# Patient Record
Sex: FEMALE | Race: WHITE | NOT HISPANIC OR LATINO | URBAN - METROPOLITAN AREA
[De-identification: names, ages, dates, MRNs, and addresses within clinical notes are randomized per-mention and may not be internally consistent; named-entity substitution may affect disease eponyms.]

---

## 2019-02-04 ENCOUNTER — INPATIENT (INPATIENT)
Facility: HOSPITAL | Age: 84
LOS: 6 days | Discharge: NURSING, REHAB OR LONG-TERM CARE | DRG: 482 | End: 2019-02-11
Attending: ORTHOPAEDIC SURGERY | Admitting: ORTHOPAEDIC SURGERY
Payer: MEDICARE

## 2019-02-04 ENCOUNTER — TRANSCRIPTION ENCOUNTER (OUTPATIENT)
Age: 84
End: 2019-02-04

## 2019-02-04 VITALS
SYSTOLIC BLOOD PRESSURE: 176 MMHG | DIASTOLIC BLOOD PRESSURE: 86 MMHG | RESPIRATION RATE: 20 BRPM | TEMPERATURE: 98 F | HEART RATE: 82 BPM | OXYGEN SATURATION: 98 % | WEIGHT: 130.07 LBS

## 2019-02-04 DIAGNOSIS — Z98.891 HISTORY OF UTERINE SCAR FROM PREVIOUS SURGERY: Chronic | ICD-10-CM

## 2019-02-04 DIAGNOSIS — Z90.49 ACQUIRED ABSENCE OF OTHER SPECIFIED PARTS OF DIGESTIVE TRACT: Chronic | ICD-10-CM

## 2019-02-04 LAB
ALBUMIN SERPL ELPH-MCNC: 4.1 G/DL — SIGNIFICANT CHANGE UP (ref 3.3–5)
ALP SERPL-CCNC: 86 U/L — SIGNIFICANT CHANGE UP (ref 40–120)
ALT FLD-CCNC: 14 U/L — SIGNIFICANT CHANGE UP (ref 10–45)
ANION GAP SERPL CALC-SCNC: 14 MMOL/L — SIGNIFICANT CHANGE UP (ref 5–17)
APTT BLD: 28.9 SEC — SIGNIFICANT CHANGE UP (ref 27.5–36.3)
AST SERPL-CCNC: 20 U/L — SIGNIFICANT CHANGE UP (ref 10–40)
BASOPHILS # BLD AUTO: 0 K/UL — SIGNIFICANT CHANGE UP (ref 0–0.2)
BASOPHILS NFR BLD AUTO: 0.4 % — SIGNIFICANT CHANGE UP (ref 0–2)
BILIRUB SERPL-MCNC: 0.3 MG/DL — SIGNIFICANT CHANGE UP (ref 0.2–1.2)
BLD GP AB SCN SERPL QL: NEGATIVE — SIGNIFICANT CHANGE UP
BUN SERPL-MCNC: 15 MG/DL — SIGNIFICANT CHANGE UP (ref 7–23)
CALCIUM SERPL-MCNC: 8.9 MG/DL — SIGNIFICANT CHANGE UP (ref 8.4–10.5)
CHLORIDE SERPL-SCNC: 97 MMOL/L — SIGNIFICANT CHANGE UP (ref 96–108)
CO2 SERPL-SCNC: 25 MMOL/L — SIGNIFICANT CHANGE UP (ref 22–31)
CREAT SERPL-MCNC: 0.39 MG/DL — LOW (ref 0.5–1.3)
EOSINOPHIL # BLD AUTO: 0 K/UL — SIGNIFICANT CHANGE UP (ref 0–0.5)
EOSINOPHIL NFR BLD AUTO: 0.4 % — SIGNIFICANT CHANGE UP (ref 0–6)
GLUCOSE SERPL-MCNC: 130 MG/DL — HIGH (ref 70–99)
HCT VFR BLD CALC: 44.5 % — SIGNIFICANT CHANGE UP (ref 34.5–45)
HGB BLD-MCNC: 15.2 G/DL — SIGNIFICANT CHANGE UP (ref 11.5–15.5)
INR BLD: 0.91 RATIO — SIGNIFICANT CHANGE UP (ref 0.88–1.16)
LYMPHOCYTES # BLD AUTO: 1.7 K/UL — SIGNIFICANT CHANGE UP (ref 1–3.3)
LYMPHOCYTES # BLD AUTO: 19.1 % — SIGNIFICANT CHANGE UP (ref 13–44)
MCHC RBC-ENTMCNC: 29.7 PG — SIGNIFICANT CHANGE UP (ref 27–34)
MCHC RBC-ENTMCNC: 34.2 GM/DL — SIGNIFICANT CHANGE UP (ref 32–36)
MCV RBC AUTO: 86.9 FL — SIGNIFICANT CHANGE UP (ref 80–100)
MONOCYTES # BLD AUTO: 0.6 K/UL — SIGNIFICANT CHANGE UP (ref 0–0.9)
MONOCYTES NFR BLD AUTO: 6.6 % — SIGNIFICANT CHANGE UP (ref 2–14)
NEUTROPHILS # BLD AUTO: 6.6 K/UL — SIGNIFICANT CHANGE UP (ref 1.8–7.4)
NEUTROPHILS NFR BLD AUTO: 73.5 % — SIGNIFICANT CHANGE UP (ref 43–77)
PLATELET # BLD AUTO: 276 K/UL — SIGNIFICANT CHANGE UP (ref 150–400)
POTASSIUM SERPL-MCNC: 3.6 MMOL/L — SIGNIFICANT CHANGE UP (ref 3.5–5.3)
POTASSIUM SERPL-SCNC: 3.6 MMOL/L — SIGNIFICANT CHANGE UP (ref 3.5–5.3)
PROT SERPL-MCNC: 7.4 G/DL — SIGNIFICANT CHANGE UP (ref 6–8.3)
PROTHROM AB SERPL-ACNC: 10.4 SEC — SIGNIFICANT CHANGE UP (ref 10–12.9)
RBC # BLD: 5.12 M/UL — SIGNIFICANT CHANGE UP (ref 3.8–5.2)
RBC # FLD: 12.4 % — SIGNIFICANT CHANGE UP (ref 10.3–14.5)
RH IG SCN BLD-IMP: POSITIVE — SIGNIFICANT CHANGE UP
SODIUM SERPL-SCNC: 136 MMOL/L — SIGNIFICANT CHANGE UP (ref 135–145)
WBC # BLD: 9 K/UL — SIGNIFICANT CHANGE UP (ref 3.8–10.5)
WBC # FLD AUTO: 9 K/UL — SIGNIFICANT CHANGE UP (ref 3.8–10.5)

## 2019-02-04 PROCEDURE — 73552 X-RAY EXAM OF FEMUR 2/>: CPT | Mod: 26,RT

## 2019-02-04 PROCEDURE — 70450 CT HEAD/BRAIN W/O DYE: CPT | Mod: 26

## 2019-02-04 PROCEDURE — 93010 ELECTROCARDIOGRAM REPORT: CPT

## 2019-02-04 PROCEDURE — 73502 X-RAY EXAM HIP UNI 2-3 VIEWS: CPT | Mod: 26,RT

## 2019-02-04 PROCEDURE — 99285 EMERGENCY DEPT VISIT HI MDM: CPT | Mod: 25

## 2019-02-04 PROCEDURE — 71045 X-RAY EXAM CHEST 1 VIEW: CPT | Mod: 26

## 2019-02-04 RX ORDER — ACETAMINOPHEN 500 MG
1000 TABLET ORAL ONCE
Qty: 0 | Refills: 0 | Status: COMPLETED | OUTPATIENT
Start: 2019-02-04 | End: 2019-02-04

## 2019-02-04 RX ORDER — LABETALOL HCL 100 MG
10 TABLET ORAL ONCE
Qty: 0 | Refills: 0 | Status: COMPLETED | OUTPATIENT
Start: 2019-02-04 | End: 2019-02-04

## 2019-02-04 RX ADMIN — Medication 10 MILLIGRAM(S): at 21:38

## 2019-02-04 RX ADMIN — Medication 400 MILLIGRAM(S): at 21:50

## 2019-02-04 RX ADMIN — Medication 1000 MILLIGRAM(S): at 22:00

## 2019-02-04 NOTE — ED ADULT NURSE NOTE - CHPI ED NUR SYMPTOMS NEG
no confusion/no fever/no vomiting/no loss of consciousness/no tingling/no numbness/no abrasion/no bleeding

## 2019-02-04 NOTE — ED PROVIDER NOTE - MUSCULOSKELETAL, MLM
Spine appears normal, no midline spine tenderness, R Hip TTP. R LE is shortened and externally rotated at rest. Pain w/ R LE movement.

## 2019-02-04 NOTE — ED ADULT NURSE NOTE - NSIMPLEMENTINTERV_GEN_ALL_ED
Implemented All Fall Risk Interventions:  [place] to call system. Call bell, personal items and telephone within reach. Instruct patient to call for assistance. Room bathroom lighting operational. Non-slip footwear when patient is off stretcher. Physically safe environment: no spills, clutter or unnecessary equipment. Stretcher in lowest position, wheels locked, appropriate side rails in place. Provide visual cue, wrist band, yellow gown, etc. Monitor gait and stability. Monitor for mental status changes and reorient to person, place, and time. Review medications for side effects contributing to fall risk. Reinforce activity limits and safety measures with patient and family.

## 2019-02-04 NOTE — ED ADULT NURSE NOTE - OBJECTIVE STATEMENT
82 YO female with PMH HTN via EMS from home presenting with complaints of pain sp fall. As per EMS, while patient was walking on wood floor, pt experienced mechanical fall and fell onto right hip. Pt developed pain and was provided IV morphine from EMS with relief. Pt denies hitting head, pt denies losing consciousness, pt able to recall all events. Pt denies chest pain, shortness of breath, visual disturbances, numbness/tingling, fever, chills, diaphoresis, headache, nausea, vomiting, constipation, diarrhea, or urinary symptoms.   Pt Axox4, gross neuro intact, PERRL 3 mm. Lungs clear throughout bilateral. S1S2 heard, normal sinus on cardiac monitor. Abdomen soft, non-tender, non-distended. Skin warm, dry, and intact. Right leg shortened and externally rotated. Pt placed in position of comfort. Pt educated on call bell system and provided call bell. Bed in lowest position, wheels locked, appropriate side rails raised. Pt denies needs at this time.

## 2019-02-04 NOTE — ED PROVIDER NOTE - ATTENDING CONTRIBUTION TO CARE
84 yo F, accompanied by daughter whom she lives with, w/ no known PMH other than possibly hypertension, with r hip ext rotation, can not raise leg, analgesia given prehospital, plain films ordered, r/o hip fx, mechanical fall, preop labs, no head injury with ha for several days, well appearing, ct head ordered.

## 2019-02-04 NOTE — ED PROVIDER NOTE - PROGRESS NOTE DETAILS
Xray pelvis demonstrating Acute right intertrochenteric hip fracture. Paged orthopedic surgery resident taking consults. Awaiting callback. Spoke with orthopedic surgery resident. Will see patient in ED.

## 2019-02-04 NOTE — ED PROVIDER NOTE - OBJECTIVE STATEMENT
84 yo F, accompanied by daughter whom she lives with, w/ no known PMH other than possibly hypertension because patient refuses to see doctors, presenting to [facility] ED from home d/t mechanical fall w/ R hip pain and deformity. 82 yo F, accompanied by daughter whom she lives with, w/ no known PMH other than possibly hypertension because patient refuses to see doctors, presenting to [facility] ED from home d/t mechanical fall w/ R hip pain and deformity. Patient was picking up dishes when she accidentally fell at home. Denies cp, sob, lightheadedness, palpitations, LOC before, during, or after the fall. Felt immediate R hip pain. Patient denies other complaints other than chronic headache. Denies neck stiffness, fever/chills, vision change, chest pain, shortness of breath, difficulty breathing, palpitations, lightheadedness, weakness, dizziness, numbness, tingling, abdominal pain, nausea, vomiting, diarrhea, dysuria, hematuria, syncope.     PMD: Does not have one  Pharmacy: [facility], [place], [place]

## 2019-02-05 DIAGNOSIS — S72.143A DISPLACED INTERTROCHANTERIC FRACTURE OF UNSPECIFIED FEMUR, INITIAL ENCOUNTER FOR CLOSED FRACTURE: ICD-10-CM

## 2019-02-05 LAB
ANION GAP SERPL CALC-SCNC: 13 MMOL/L — SIGNIFICANT CHANGE UP (ref 5–17)
APPEARANCE UR: CLEAR — SIGNIFICANT CHANGE UP
APTT BLD: 29 SEC — SIGNIFICANT CHANGE UP (ref 27.5–36.3)
BILIRUB UR-MCNC: NEGATIVE — SIGNIFICANT CHANGE UP
BUN SERPL-MCNC: 14 MG/DL — SIGNIFICANT CHANGE UP (ref 7–23)
CALCIUM SERPL-MCNC: 9.3 MG/DL — SIGNIFICANT CHANGE UP (ref 8.4–10.5)
CHLORIDE SERPL-SCNC: 98 MMOL/L — SIGNIFICANT CHANGE UP (ref 96–108)
CO2 SERPL-SCNC: 24 MMOL/L — SIGNIFICANT CHANGE UP (ref 22–31)
COLOR SPEC: YELLOW — SIGNIFICANT CHANGE UP
CREAT SERPL-MCNC: 0.38 MG/DL — LOW (ref 0.5–1.3)
DIFF PNL FLD: NEGATIVE — SIGNIFICANT CHANGE UP
GLUCOSE SERPL-MCNC: 157 MG/DL — HIGH (ref 70–99)
GLUCOSE UR QL: NEGATIVE — SIGNIFICANT CHANGE UP
HCT VFR BLD CALC: 35.3 % — SIGNIFICANT CHANGE UP (ref 34.5–45)
HCT VFR BLD CALC: 41.9 % — SIGNIFICANT CHANGE UP (ref 34.5–45)
HGB BLD-MCNC: 12.2 G/DL — SIGNIFICANT CHANGE UP (ref 11.5–15.5)
HGB BLD-MCNC: 14.2 G/DL — SIGNIFICANT CHANGE UP (ref 11.5–15.5)
INR BLD: 0.94 RATIO — SIGNIFICANT CHANGE UP (ref 0.88–1.16)
KETONES UR-MCNC: SIGNIFICANT CHANGE UP
LEUKOCYTE ESTERASE UR-ACNC: NEGATIVE — SIGNIFICANT CHANGE UP
MCHC RBC-ENTMCNC: 29.5 PG — SIGNIFICANT CHANGE UP (ref 27–34)
MCHC RBC-ENTMCNC: 30.2 PG — SIGNIFICANT CHANGE UP (ref 27–34)
MCHC RBC-ENTMCNC: 33.9 GM/DL — SIGNIFICANT CHANGE UP (ref 32–36)
MCHC RBC-ENTMCNC: 34.5 GM/DL — SIGNIFICANT CHANGE UP (ref 32–36)
MCV RBC AUTO: 86.9 FL — SIGNIFICANT CHANGE UP (ref 80–100)
MCV RBC AUTO: 87.5 FL — SIGNIFICANT CHANGE UP (ref 80–100)
NITRITE UR-MCNC: NEGATIVE — SIGNIFICANT CHANGE UP
PH UR: 5.5 — SIGNIFICANT CHANGE UP (ref 5–8)
PLATELET # BLD AUTO: 254 K/UL — SIGNIFICANT CHANGE UP (ref 150–400)
PLATELET # BLD AUTO: ABNORMAL (ref 150–400)
POTASSIUM SERPL-MCNC: 4 MMOL/L — SIGNIFICANT CHANGE UP (ref 3.5–5.3)
POTASSIUM SERPL-SCNC: 4 MMOL/L — SIGNIFICANT CHANGE UP (ref 3.5–5.3)
PROT UR-MCNC: NEGATIVE — SIGNIFICANT CHANGE UP
PROTHROM AB SERPL-ACNC: 10.8 SEC — SIGNIFICANT CHANGE UP (ref 10–12.9)
RBC # BLD: 4.03 M/UL — SIGNIFICANT CHANGE UP (ref 3.8–5.2)
RBC # BLD: 4.82 M/UL — SIGNIFICANT CHANGE UP (ref 3.8–5.2)
RBC # FLD: 12.1 % — SIGNIFICANT CHANGE UP (ref 10.3–14.5)
RBC # FLD: 12.3 % — SIGNIFICANT CHANGE UP (ref 10.3–14.5)
RH IG SCN BLD-IMP: POSITIVE — SIGNIFICANT CHANGE UP
SODIUM SERPL-SCNC: 135 MMOL/L — SIGNIFICANT CHANGE UP (ref 135–145)
SP GR SPEC: 1.03 — HIGH (ref 1.01–1.02)
UROBILINOGEN FLD QL: NEGATIVE — SIGNIFICANT CHANGE UP
WBC # BLD: 15.6 K/UL — HIGH (ref 3.8–10.5)
WBC # BLD: 17 K/UL — HIGH (ref 3.8–10.5)
WBC # FLD AUTO: 15.6 K/UL — HIGH (ref 3.8–10.5)
WBC # FLD AUTO: 17 K/UL — HIGH (ref 3.8–10.5)

## 2019-02-05 PROCEDURE — 27245 TREAT THIGH FRACTURE: CPT | Mod: RT

## 2019-02-05 RX ORDER — ONDANSETRON 8 MG/1
4 TABLET, FILM COATED ORAL ONCE
Qty: 0 | Refills: 0 | Status: DISCONTINUED | OUTPATIENT
Start: 2019-02-05 | End: 2019-02-06

## 2019-02-05 RX ORDER — ACETAMINOPHEN 500 MG
650 TABLET ORAL EVERY 6 HOURS
Qty: 0 | Refills: 0 | Status: DISCONTINUED | OUTPATIENT
Start: 2019-02-05 | End: 2019-02-11

## 2019-02-05 RX ORDER — DOCUSATE SODIUM 100 MG
100 CAPSULE ORAL THREE TIMES A DAY
Qty: 0 | Refills: 0 | Status: DISCONTINUED | OUTPATIENT
Start: 2019-02-05 | End: 2019-02-05

## 2019-02-05 RX ORDER — SODIUM CHLORIDE 9 MG/ML
1000 INJECTION, SOLUTION INTRAVENOUS
Qty: 0 | Refills: 0 | Status: DISCONTINUED | OUTPATIENT
Start: 2019-02-05 | End: 2019-02-11

## 2019-02-05 RX ORDER — OXYCODONE HYDROCHLORIDE 5 MG/1
10 TABLET ORAL EVERY 4 HOURS
Qty: 0 | Refills: 0 | Status: DISCONTINUED | OUTPATIENT
Start: 2019-02-05 | End: 2019-02-05

## 2019-02-05 RX ORDER — SENNA PLUS 8.6 MG/1
2 TABLET ORAL AT BEDTIME
Qty: 0 | Refills: 0 | Status: DISCONTINUED | OUTPATIENT
Start: 2019-02-05 | End: 2019-02-11

## 2019-02-05 RX ORDER — POLYETHYLENE GLYCOL 3350 17 G/17G
17 POWDER, FOR SOLUTION ORAL DAILY
Qty: 0 | Refills: 0 | Status: DISCONTINUED | OUTPATIENT
Start: 2019-02-05 | End: 2019-02-11

## 2019-02-05 RX ORDER — CEFAZOLIN SODIUM 1 G
2000 VIAL (EA) INJECTION EVERY 8 HOURS
Qty: 0 | Refills: 0 | Status: COMPLETED | OUTPATIENT
Start: 2019-02-06 | End: 2019-02-06

## 2019-02-05 RX ORDER — SODIUM CHLORIDE 9 MG/ML
1000 INJECTION, SOLUTION INTRAVENOUS
Qty: 0 | Refills: 0 | Status: DISCONTINUED | OUTPATIENT
Start: 2019-02-05 | End: 2019-02-05

## 2019-02-05 RX ORDER — FOLIC ACID 0.8 MG
1 TABLET ORAL DAILY
Qty: 0 | Refills: 0 | Status: DISCONTINUED | OUTPATIENT
Start: 2019-02-05 | End: 2019-02-10

## 2019-02-05 RX ORDER — HYDROMORPHONE HYDROCHLORIDE 2 MG/ML
0.5 INJECTION INTRAMUSCULAR; INTRAVENOUS; SUBCUTANEOUS EVERY 4 HOURS
Qty: 0 | Refills: 0 | Status: DISCONTINUED | OUTPATIENT
Start: 2019-02-05 | End: 2019-02-11

## 2019-02-05 RX ORDER — PANTOPRAZOLE SODIUM 20 MG/1
40 TABLET, DELAYED RELEASE ORAL
Qty: 0 | Refills: 0 | Status: DISCONTINUED | OUTPATIENT
Start: 2019-02-05 | End: 2019-02-11

## 2019-02-05 RX ORDER — ASCORBIC ACID 60 MG
500 TABLET,CHEWABLE ORAL
Qty: 0 | Refills: 0 | Status: DISCONTINUED | OUTPATIENT
Start: 2019-02-05 | End: 2019-02-11

## 2019-02-05 RX ORDER — DOCUSATE SODIUM 100 MG
100 CAPSULE ORAL THREE TIMES A DAY
Qty: 0 | Refills: 0 | Status: DISCONTINUED | OUTPATIENT
Start: 2019-02-05 | End: 2019-02-11

## 2019-02-05 RX ORDER — OXYCODONE HYDROCHLORIDE 5 MG/1
5 TABLET ORAL EVERY 4 HOURS
Qty: 0 | Refills: 0 | Status: DISCONTINUED | OUTPATIENT
Start: 2019-02-05 | End: 2019-02-11

## 2019-02-05 RX ORDER — ACETAMINOPHEN 500 MG
975 TABLET ORAL EVERY 8 HOURS
Qty: 0 | Refills: 0 | Status: DISCONTINUED | OUTPATIENT
Start: 2019-02-05 | End: 2019-02-05

## 2019-02-05 RX ORDER — ASCORBIC ACID 60 MG
500 TABLET,CHEWABLE ORAL
Qty: 0 | Refills: 0 | Status: DISCONTINUED | OUTPATIENT
Start: 2019-02-05 | End: 2019-02-05

## 2019-02-05 RX ORDER — FOLIC ACID 0.8 MG
1 TABLET ORAL DAILY
Qty: 0 | Refills: 0 | Status: DISCONTINUED | OUTPATIENT
Start: 2019-02-05 | End: 2019-02-11

## 2019-02-05 RX ORDER — OXYCODONE HYDROCHLORIDE 5 MG/1
10 TABLET ORAL EVERY 4 HOURS
Qty: 0 | Refills: 0 | Status: DISCONTINUED | OUTPATIENT
Start: 2019-02-05 | End: 2019-02-11

## 2019-02-05 RX ORDER — OXYCODONE HYDROCHLORIDE 5 MG/1
5 TABLET ORAL EVERY 4 HOURS
Qty: 0 | Refills: 0 | Status: DISCONTINUED | OUTPATIENT
Start: 2019-02-05 | End: 2019-02-05

## 2019-02-05 RX ORDER — HYDROMORPHONE HYDROCHLORIDE 2 MG/ML
0.2 INJECTION INTRAMUSCULAR; INTRAVENOUS; SUBCUTANEOUS
Qty: 0 | Refills: 0 | Status: DISCONTINUED | OUTPATIENT
Start: 2019-02-05 | End: 2019-02-06

## 2019-02-05 RX ORDER — ENOXAPARIN SODIUM 100 MG/ML
40 INJECTION SUBCUTANEOUS DAILY
Qty: 0 | Refills: 0 | Status: DISCONTINUED | OUTPATIENT
Start: 2019-02-06 | End: 2019-02-11

## 2019-02-05 RX ORDER — FOLIC ACID 0.8 MG
1 TABLET ORAL DAILY
Qty: 0 | Refills: 0 | Status: DISCONTINUED | OUTPATIENT
Start: 2019-02-05 | End: 2019-02-05

## 2019-02-05 RX ADMIN — Medication 1 TABLET(S): at 13:14

## 2019-02-05 RX ADMIN — OXYCODONE HYDROCHLORIDE 10 MILLIGRAM(S): 5 TABLET ORAL at 13:50

## 2019-02-05 RX ADMIN — HYDROMORPHONE HYDROCHLORIDE 0.2 MILLIGRAM(S): 2 INJECTION INTRAMUSCULAR; INTRAVENOUS; SUBCUTANEOUS at 23:25

## 2019-02-05 RX ADMIN — SODIUM CHLORIDE 75 MILLILITER(S): 9 INJECTION, SOLUTION INTRAVENOUS at 21:27

## 2019-02-05 RX ADMIN — Medication 500 MILLIGRAM(S): at 05:42

## 2019-02-05 RX ADMIN — OXYCODONE HYDROCHLORIDE 10 MILLIGRAM(S): 5 TABLET ORAL at 18:02

## 2019-02-05 RX ADMIN — OXYCODONE HYDROCHLORIDE 10 MILLIGRAM(S): 5 TABLET ORAL at 13:14

## 2019-02-05 RX ADMIN — Medication 100 MILLIGRAM(S): at 13:22

## 2019-02-05 RX ADMIN — OXYCODONE HYDROCHLORIDE 10 MILLIGRAM(S): 5 TABLET ORAL at 05:42

## 2019-02-05 RX ADMIN — Medication 1 MILLIGRAM(S): at 13:15

## 2019-02-05 RX ADMIN — OXYCODONE HYDROCHLORIDE 5 MILLIGRAM(S): 5 TABLET ORAL at 02:57

## 2019-02-05 RX ADMIN — HYDROMORPHONE HYDROCHLORIDE 0.2 MILLIGRAM(S): 2 INJECTION INTRAMUSCULAR; INTRAVENOUS; SUBCUTANEOUS at 23:10

## 2019-02-05 RX ADMIN — Medication 500 MILLIGRAM(S): at 18:02

## 2019-02-05 RX ADMIN — OXYCODONE HYDROCHLORIDE 5 MILLIGRAM(S): 5 TABLET ORAL at 02:27

## 2019-02-05 RX ADMIN — Medication 100 MILLIGRAM(S): at 05:41

## 2019-02-05 NOTE — H&P ADULT - NSHPLABSRESULTS_GEN_ALL_CORE
LABS:                        15.2   9.0   )-----------( 276      ( 04 Feb 2019 22:06 )             44.5     04 Feb 2019 22:06    136    |  97     |  15     ----------------------------<  130    3.6     |  25     |  0.39     Ca    8.9        04 Feb 2019 22:06    TPro  7.4    /  Alb  4.1    /  TBili  0.3    /  DBili  x      /  AST  20     /  ALT  14     /  AlkPhos  86     04 Feb 2019 22:06    PT/INR - ( 04 Feb 2019 22:06 )   PT: 10.4 sec;   INR: 0.91 ratio         PTT - ( 04 Feb 2019 22:06 )  PTT:28.9 sec      XRAY R Hip: Intertrochanteric fracture, complete displacement of the lesser trochanter.

## 2019-02-05 NOTE — PRE-ANESTHESIA EVALUATION ADULT - NSANTHOSAYNRD_GEN_A_CORE
No. LELAND screening performed.  STOP BANG Legend: 0-2 = LOW Risk; 3-4 = INTERMEDIATE Risk; 5-8 = HIGH Risk

## 2019-02-05 NOTE — CHART NOTE - NSCHARTNOTEFT_GEN_A_CORE
Patient seen in RR. Resting with complaints of mild pain to right hip.  Denies Chest Pain, SOB, N/V.    T(C): 36.4 (02-05-19 @ 22:30), Max: 37.3 (02-05-19 @ 17:18)  HR: 66 (02-05-19 @ 22:30) (65 - 88)  BP: 122/59 (02-05-19 @ 22:30) (101/63 - 151/77)  RR: 17 (02-05-19 @ 22:30) (15 - 20)  SpO2: 95% (02-05-19 @ 22:30) (91% - 95%)  Wt(kg): --    Exam:  Alert and [place], No Acute Distress  Card: +S1/S2, RRR  Pulm: CTAB  Laterality: R hip aquacels c/d/i  Calves soft, non-tender bilaterally  +PF/DF/EHL/FHL  SILT  + DP pulse    Xray: Intra-op images in chart                        12.2   17.0  )-----------( CLUMPED    ( 05 Feb 2019 21:46 )             35.3    02-05    135  |  98  |  14  ----------------------------<  157<H>  4.0   |  24  |  0.38<L>    Ca    9.3      05 Feb 2019 05:50    TPro  7.4  /  Alb  4.1  /  TBili  0.3  /  DBili  x   /  AST  20  /  ALT  14  /  AlkPhos  86  02-04      A/P: Patient is a 83y Female S/p R IMN. VSS. NAD  -PT/OT-WBAT   -IS encouraged  -DVT PPx- Lovenox 40 mg SC  -Pain Control PRN  -OOB to chair POD#1  -FU AM labs    Mary Fernandez PA-C  Team Pager #[phone]

## 2019-02-05 NOTE — ED ADULT NURSE REASSESSMENT NOTE - NS ED NURSE REASSESS COMMENT FT1
After receiving IV labetalol at 2138, pt blood pressure decreased to 115/77. Due to decrease in BP, MD Dugan aware, no interventions at this time. Pt denies chest pain, shortness of breath, visual disturbances, numbness/tingling, fever, chills, diaphoresis, headache, nausea, vomiting, constipation, diarrhea, or urinary symptoms. Pt placed in position of comfort. Pt educated on call bell system and provided call bell. Bed in lowest position, wheels locked, appropriate side rails raised. Pt denies needs at this time. All fall risk precautions in place.
Pt belongings (valuables and clothing) to be taken home with family in anticipation of surgery in AM. Pt placed in position of comfort. Pt educated on call bell system and provided call bell. Bed in lowest position, wheels locked, appropriate side rails raised. Pt denies needs at this time. all fall risk precautions in place.

## 2019-02-05 NOTE — PROGRESS NOTE ADULT - SUBJECTIVE AND OBJECTIVE BOX
ORTHO ATTENDING POST OP    S/P IM FIXATION  R  HIP  WBAT  R  LE  lovenox 40 QD  venodynes  ancef 1 g x 24 h  OOB to chair in AM  rehab consult   f/u medicine  CBC in RR and AM

## 2019-02-05 NOTE — PACU DISCHARGE NOTE - COMMENTS
No anesthetic contraindications for PACU discharge.  Requires 2L O2 via nasal cannula and continuous pulse oximetry

## 2019-02-05 NOTE — CONSULT NOTE ADULT - SUBJECTIVE AND OBJECTIVE BOX
The patient was seen and examined today after an accidental fall with a right intertrochanteric hips fracture that requires orthopedic.  Her only  comorbidity is advanced age. Her medical condition is  optimized and she                                                                has no medical contraindication to surgery today as required. Exam time 70 minutes including > than 50 % for bedside discussion and counseling. Comprehensive consultation dictated #[id number]. The patient was seen and examined today after an accidental fall with a right intertrochanteric hips fracture that requires orthopedic.  Her only  comorbidity is advanced age. Her medical condition is  optimized and she has no medical contraindication to surgery today as required. Exam time 70 minutes including > than 50 % for bedside discussion and counseling. Comprehensive consultation dictated #[id number]. The patient was seen and examined today after an accidental fall with a right intertrochanteric hips fracture that requires orthopedic.  Her only  comorbidity is advanced age. Her medical condition is  optimized and she has no medical contraindication to surgery today as required. Exam time 70 minutes including > than 50 % for bedside discussion and counseling. Comprehensive consultation dictated #[id number].    The patient was admitted earlier today on 2019, awaiting surgery for a right hip fracture scheduled for later today.    HISTORY OF PRESENT ILLNESS:  The patient is an 83 years older she was at home in the kitchen and she turned and accidentally fell landing on her right hip.  She was unable to rise, called an ambulance and was brought to the emergency room where x-rays confirmed an acute fracture of her right hip.  It was noted to be intertrochanteric.  The patient is awaiting surgical intervention that is anticipated for later today.  She is in moderate to severe pain, but keeps her leg in one position, immobilized and therefore it does not hurt quite as much.    MEDICATIONS:  Her regular medications at home are none.  She takes no medications.    ALLERGY:  SHE HAS NO KNOWN ALLERGIES.    PAST MEDICAL HISTORY:  She has no past medical illnesses.    PAST SURGICAL HISTORY:  Significant for a  x1.  She fell off a truck in  with lumbar fracture which required ORIF.  She had appendicitis in  with an appendectomy and in 2016 she had bilateral cataract surgery.    DIET:  Her diet is regular.  Her present weight is approximately 125 pounds.    SOCIAL HISTORY:  She is a nonsmoker, nondrinker with no drug history.  Socially she lives with her daughter and son-in-law.  She retired from working at age 77.    FAMILY HISTORY:  Significant only for hypertension and heart disease of both parents each  in their early to mid 70s.    REVIEW OF SYSTEMS:  She has no headaches or dizziness.  She has no changes in hearing or vision.  She has diminished hearing.  Her vision has been corrected with cataracts.  She has no sinusitis or dental disease.  She has no difficulty swallowing.  She has no shortness of breath, cough, congestion or wheezing at rest or with exertion.  She has no chest pain, palpitations or other cardiac issues.  She has no abdominal pain, change in bowel habits or GI bleeding.  She has no reflux.  She has no dysuria, frequency or incontinence.  She has no rashes.  She has no DJD of her spine with no limitations in walking.  She has no significant arthritis.  She has no diabetes or thyroid disease.  Neurological examination was normal.  She has no complaints of numbness, tingling or weakness.  She has had no prior strokes or seizures.    PHYSICAL EXAMINATION:  VITAL SIGNS:  Physical examination at this time, shows a blood pressure of 127/68, pulse is 81, respirations are 19.  HEAD AND NECK:  Examination is normal.  CHEST:  Clear.  CARDIAC:  Examination is normal.  ABDOMEN:  Soft with no masses, tenderness, guarding or rebound.  BREAST:  Examination is without masses or injuries.  EXTREMITIES:  Her extremities are without clubbing, cyanosis or edema.  She does have moderate to severe right hip pain at the site of fracture.  She has to mobilized and keeping it from being used.  She has a bandage and her leg will stay bandaged unless removed by orthopedics.  NEUROLOGIC:  Examination was within normal limits with no focal deficits.  Her right leg has 4+ proximal edema with 20 degree right lateral rotation consistent with an intertrochanteric fracture.  It is also 1 cm shorter than the left leg.    LABORATORY DATA:  Laboratories obtained shows a hemoglobin of 14.2, hematocrit of 41.9, white count of 15.6, platelet count of 254,000.  INR is 0.94.  PTT is 29.0.  Electrolytes, sodium is 135, potassium is 4.0, chloride is 98, CO2 is 24, BUN is 14, creatinine is 0.38 and blood glucose is 157.  BUN is 14, creatinine is 0.38, blood sugars as stated was 157.  The patient has no prior history of diabetes.  Urinalysis is normal.    DIAGNOSTIC DATA:  CT of the brain was performed with no intracranial hemorrhage or stroke.  Femur x-ray confirmed a right intertrochanteric fracture as stated.  Chest x-ray performed was clear with no acute atelectasis.    The chest x-ray was performed.    IMPRESSION:  At this time, is that the patient has a right intertrochanteric hip fracture that requires open reduction and internal fixation stabilization, so that she can resume ambulation and control her pain.  She has no medical issues and takes no medications.  She feels well and is oriented x3.  She has no medical risk factors and she has no medical contraindications for surgery as is planned.        _______________________    DICT:	DEOB DUTTON MD ([id number]) 2019 02:07 PM  TRANS:	[id number] 2019 02:17 PM  JOB:	[id number]    Electronically Signed by:  DEBO DUTTON 2019 10:12:16 AM

## 2019-02-05 NOTE — BRIEF OPERATIVE NOTE - PROCEDURE
<<-----Click on this checkbox to enter Procedure Intramedullary nailing for fracture of femur  02/05/2019    Active  AVJOB

## 2019-02-05 NOTE — H&P ADULT - HISTORY OF PRESENT ILLNESS
83 y F community ambulator without assistive devices presents to the ED sp witnessed MF at her home earlier today by her daughter. Patient reports she had pain in her righ hip after the fall and was unable to ambulate. Denies HH or LOC. Patient has no significant PMH, does not follow up with a PCP. Patient does not follow up with an orthopedist. Patient denies any other injuries. Denies fever, chill, sob, chest pain, dizziness. Denies numbness or tingling of the RLE. No other complaints at this time.

## 2019-02-06 DIAGNOSIS — S72.143A DISPLACED INTERTROCHANTERIC FRACTURE OF UNSPECIFIED FEMUR, INITIAL ENCOUNTER FOR CLOSED FRACTURE: ICD-10-CM

## 2019-02-06 DIAGNOSIS — K59.00 CONSTIPATION, UNSPECIFIED: ICD-10-CM

## 2019-02-06 DIAGNOSIS — R52 PAIN, UNSPECIFIED: ICD-10-CM

## 2019-02-06 LAB
ANION GAP SERPL CALC-SCNC: 13 MMOL/L — SIGNIFICANT CHANGE UP (ref 5–17)
BUN SERPL-MCNC: 8 MG/DL — SIGNIFICANT CHANGE UP (ref 7–23)
CALCIUM SERPL-MCNC: 8.4 MG/DL — SIGNIFICANT CHANGE UP (ref 8.4–10.5)
CHLORIDE SERPL-SCNC: 102 MMOL/L — SIGNIFICANT CHANGE UP (ref 96–108)
CO2 SERPL-SCNC: 24 MMOL/L — SIGNIFICANT CHANGE UP (ref 22–31)
CREAT SERPL-MCNC: 0.37 MG/DL — LOW (ref 0.5–1.3)
GLUCOSE SERPL-MCNC: 173 MG/DL — HIGH (ref 70–99)
HCT VFR BLD CALC: 35.2 % — SIGNIFICANT CHANGE UP (ref 34.5–45)
HGB BLD-MCNC: 12.1 G/DL — SIGNIFICANT CHANGE UP (ref 11.5–15.5)
MCHC RBC-ENTMCNC: 30.2 PG — SIGNIFICANT CHANGE UP (ref 27–34)
MCHC RBC-ENTMCNC: 34.5 GM/DL — SIGNIFICANT CHANGE UP (ref 32–36)
MCV RBC AUTO: 87.6 FL — SIGNIFICANT CHANGE UP (ref 80–100)
PLATELET # BLD AUTO: 222 K/UL — SIGNIFICANT CHANGE UP (ref 150–400)
POTASSIUM SERPL-MCNC: 3.7 MMOL/L — SIGNIFICANT CHANGE UP (ref 3.5–5.3)
POTASSIUM SERPL-SCNC: 3.7 MMOL/L — SIGNIFICANT CHANGE UP (ref 3.5–5.3)
RBC # BLD: 4.02 M/UL — SIGNIFICANT CHANGE UP (ref 3.8–5.2)
RBC # FLD: 12.4 % — SIGNIFICANT CHANGE UP (ref 10.3–14.5)
SODIUM SERPL-SCNC: 139 MMOL/L — SIGNIFICANT CHANGE UP (ref 135–145)
WBC # BLD: 12.1 K/UL — HIGH (ref 3.8–10.5)
WBC # FLD AUTO: 12.1 K/UL — HIGH (ref 3.8–10.5)

## 2019-02-06 RX ADMIN — OXYCODONE HYDROCHLORIDE 10 MILLIGRAM(S): 5 TABLET ORAL at 17:00

## 2019-02-06 RX ADMIN — HYDROMORPHONE HYDROCHLORIDE 0.5 MILLIGRAM(S): 2 INJECTION INTRAMUSCULAR; INTRAVENOUS; SUBCUTANEOUS at 10:17

## 2019-02-06 RX ADMIN — Medication 1 TABLET(S): at 13:20

## 2019-02-06 RX ADMIN — PANTOPRAZOLE SODIUM 40 MILLIGRAM(S): 20 TABLET, DELAYED RELEASE ORAL at 05:23

## 2019-02-06 RX ADMIN — Medication 100 MILLIGRAM(S): at 13:20

## 2019-02-06 RX ADMIN — Medication 500 MILLIGRAM(S): at 16:29

## 2019-02-06 RX ADMIN — OXYCODONE HYDROCHLORIDE 10 MILLIGRAM(S): 5 TABLET ORAL at 16:29

## 2019-02-06 RX ADMIN — Medication 1 MILLIGRAM(S): at 13:20

## 2019-02-06 RX ADMIN — Medication 100 MILLIGRAM(S): at 05:22

## 2019-02-06 RX ADMIN — OXYCODONE HYDROCHLORIDE 10 MILLIGRAM(S): 5 TABLET ORAL at 22:00

## 2019-02-06 RX ADMIN — ENOXAPARIN SODIUM 40 MILLIGRAM(S): 100 INJECTION SUBCUTANEOUS at 13:20

## 2019-02-06 RX ADMIN — HYDROMORPHONE HYDROCHLORIDE 0.5 MILLIGRAM(S): 2 INJECTION INTRAMUSCULAR; INTRAVENOUS; SUBCUTANEOUS at 09:47

## 2019-02-06 RX ADMIN — Medication 100 MILLIGRAM(S): at 05:23

## 2019-02-06 RX ADMIN — Medication 500 MILLIGRAM(S): at 05:22

## 2019-02-06 RX ADMIN — Medication 1 TABLET(S): at 13:21

## 2019-02-06 RX ADMIN — OXYCODONE HYDROCHLORIDE 10 MILLIGRAM(S): 5 TABLET ORAL at 21:27

## 2019-02-06 NOTE — PROGRESS NOTE ADULT - SUBJECTIVE AND OBJECTIVE BOX
Patient seen and examined at bedside. Pain is well controlled. no acute overnight events. NO other complaints. denies fever, chills, chest pain, sob.      Vital Signs Last 24 Hrs  T(C): 36.4 (06 Feb 2019 05:35), Max: 37.3 (05 Feb 2019 17:18)  T(F): 97.5 (06 Feb 2019 05:35), Max: 99.1 (05 Feb 2019 17:18)  HR: 65 (06 Feb 2019 05:35) (63 - 88)  BP: 112/67 (06 Feb 2019 05:35) (92/52 - 138/70)  BP(mean): 80 (05 Feb 2019 23:30) (72 - 85)  RR: 18 (06 Feb 2019 05:35) (15 - 20)  SpO2: 93% (06 Feb 2019 05:35) (91% - 95%)        PE :    GEN: NAD    RLE:  dressing c/d/i  SILT L3-S1  EHL/FHL/GSC/TA intact  dp pulse 2+  compartments soft and compressible   no calf tenderness

## 2019-02-06 NOTE — PROGRESS NOTE ADULT - SUBJECTIVE AND OBJECTIVE BOX
83 y F community ambulator without assistive devices presents to the ED sp witnessed MF at her home earlier today by her daughter. Patient reports she had pain in her righ hip after the fall and was unable to ambulate. Denies HH or LOC. Patient has no significant PMH, does not follow up with a PCP. Patient does not follow up with an orthopedist. Patient denies any other injuries. Denies fever, chill, sob, chest pain, dizziness. Denies numbness or tingling of the RLE. No other complaints at this time. Patient seen resting comfortably post op. OOB to chair. Pain well controlled    MEDICATIONS  (STANDING):  ascorbic acid 500 milliGRAM(s) Oral two times a day  calcium carbonate 1250 mG  + Vitamin D (OsCal 500 + D) 1 Tablet(s) Oral daily  docusate sodium 100 milliGRAM(s) Oral three times a day  enoxaparin Injectable 40 milliGRAM(s) SubCutaneous daily  folic acid 1 milliGRAM(s) Oral daily  folic acid 1 milliGRAM(s) Oral daily  lactated ringers. 1000 milliLiter(s) (75 mL/Hr) IV Continuous <Continuous>  multivitamin 1 Tablet(s) Oral daily  multivitamin 1 Tablet(s) Oral daily  pantoprazole    Tablet 40 milliGRAM(s) Oral before breakfast  senna 2 Tablet(s) Oral at bedtime    MEDICATIONS  (PRN):  acetaminophen   Tablet .. 650 milliGRAM(s) Oral every 6 hours PRN Temp greater or equal to 38C (100.4F), Mild Pain (1 - 3)  HYDROmorphone  Injectable 0.5 milliGRAM(s) IV Push every 4 hours PRN severe breakthrough pain  oxyCODONE    IR 10 milliGRAM(s) Oral every 4 hours PRN Severe Pain (7 - 10)  oxyCODONE    IR 5 milliGRAM(s) Oral every 4 hours PRN Moderate Pain (4 - 6)  polyethylene glycol 3350 17 Gram(s) Oral daily PRN Constipation          VITALS:   T(C): 36.5 (19 @ 09:06), Max: 37.3 (19 @ 17:18)  HR: 80 (19 @ 10:34) (63 - 88)  BP: 115/60 (19 @ 10:34) (92/52 - 138/70)  RR: 16 (19 @ 09:06) (15 - 19)  SpO2: 93% (19 @ 10:34) (91% - 95%)  Wt(kg): --        LABS:        CBC Full  -  ( 2019 08:47 )  WBC Count : 12.1 K/uL  Hemoglobin : 12.1 g/dL  Hematocrit : 35.2 %  Platelet Count - Automated : 222 K/uL  Mean Cell Volume : 87.6 fl  Mean Cell Hemoglobin : 30.2 pg  Mean Cell Hemoglobin Concentration : 34.5 gm/dL  Auto Neutrophil # : x  Auto Lymphocyte # : x  Auto Monocyte # : x  Auto Eosinophil # : x  Auto Basophil # : x  Auto Neutrophil % : x  Auto Lymphocyte % : x  Auto Monocyte % : x  Auto Eosinophil % : x  Auto Basophil % : x        139  |  102  |  8   ----------------------------<  173<H>  3.7   |  24  |  0.37<L>    Ca    8.4      2019 08:47    TPro  7.4  /  Alb  4.1  /  TBili  0.3  /  DBili  x   /  AST  20  /  ALT  14  /  AlkPhos  86  02-04    LIVER FUNCTIONS - ( 2019 22:06 )  Alb: 4.1 g/dL / Pro: 7.4 g/dL / ALK PHOS: 86 U/L / ALT: 14 U/L / AST: 20 U/L / GGT: x           PT/INR - ( 2019 05:50 )   PT: 10.8 sec;   INR: 0.94 ratio         PTT - ( 2019 05:50 )  PTT:29.0 sec  Urinalysis Basic - ( 2019 07:23 )    Color: Yellow / Appearance: Clear / S.031 / pH: x  Gluc: x / Ketone: Trace  / Bili: Negative / Urobili: Negative   Blood: x / Protein: Negative / Nitrite: Negative   Leuk Esterase: Negative / RBC: x / WBC x   Sq Epi: x / Non Sq Epi: x / Bacteria: x      CAPILLARY BLOOD GLUCOSE          RADIOLOGY & ADDITIONAL TESTS:

## 2019-02-06 NOTE — PHYSICAL THERAPY INITIAL EVALUATION ADULT - CRITERIA FOR SKILLED THERAPEUTIC INTERVENTIONS
therapy frequency/predicted duration of therapy intervention/anticipated discharge recommendation/impairments found/functional limitations in following categories/risk reduction/prevention/rehab potential

## 2019-02-06 NOTE — PROGRESS NOTE ADULT - PROBLEM SELECTOR PLAN 1
Patient tolerated procedure well  continue physical therapy  PO as tolerated  DVT and GI prophylaxis

## 2019-02-06 NOTE — PHYSICAL THERAPY INITIAL EVALUATION ADULT - ADDITIONAL COMMENTS
Pt lives with her DTR in a private house w/ 2 steps to enter and 12 steps inside. Pt amb independently PTA.

## 2019-02-07 ENCOUNTER — TRANSCRIPTION ENCOUNTER (OUTPATIENT)
Age: 84
End: 2019-02-07

## 2019-02-07 LAB
ANION GAP SERPL CALC-SCNC: 10 MMOL/L — SIGNIFICANT CHANGE UP (ref 5–17)
BUN SERPL-MCNC: 14 MG/DL — SIGNIFICANT CHANGE UP (ref 7–23)
CALCIUM SERPL-MCNC: 8.4 MG/DL — SIGNIFICANT CHANGE UP (ref 8.4–10.5)
CHLORIDE SERPL-SCNC: 101 MMOL/L — SIGNIFICANT CHANGE UP (ref 96–108)
CO2 SERPL-SCNC: 30 MMOL/L — SIGNIFICANT CHANGE UP (ref 22–31)
CREAT SERPL-MCNC: 0.52 MG/DL — SIGNIFICANT CHANGE UP (ref 0.5–1.3)
GLUCOSE SERPL-MCNC: 117 MG/DL — HIGH (ref 70–99)
HCT VFR BLD CALC: 28.5 % — LOW (ref 34.5–45)
HGB BLD-MCNC: 9.7 G/DL — LOW (ref 11.5–15.5)
MCHC RBC-ENTMCNC: 30.2 PG — SIGNIFICANT CHANGE UP (ref 27–34)
MCHC RBC-ENTMCNC: 34.2 GM/DL — SIGNIFICANT CHANGE UP (ref 32–36)
MCV RBC AUTO: 88.4 FL — SIGNIFICANT CHANGE UP (ref 80–100)
PLATELET # BLD AUTO: 194 K/UL — SIGNIFICANT CHANGE UP (ref 150–400)
POTASSIUM SERPL-MCNC: 4.7 MMOL/L — SIGNIFICANT CHANGE UP (ref 3.5–5.3)
POTASSIUM SERPL-SCNC: 4.7 MMOL/L — SIGNIFICANT CHANGE UP (ref 3.5–5.3)
RBC # BLD: 3.22 M/UL — LOW (ref 3.8–5.2)
RBC # FLD: 12.2 % — SIGNIFICANT CHANGE UP (ref 10.3–14.5)
SODIUM SERPL-SCNC: 141 MMOL/L — SIGNIFICANT CHANGE UP (ref 135–145)
WBC # BLD: 9.7 K/UL — SIGNIFICANT CHANGE UP (ref 3.8–10.5)
WBC # FLD AUTO: 9.7 K/UL — SIGNIFICANT CHANGE UP (ref 3.8–10.5)

## 2019-02-07 RX ORDER — ASPIRIN/CALCIUM CARB/MAGNESIUM 324 MG
1 TABLET ORAL
Qty: 60 | Refills: 0 | OUTPATIENT
Start: 2019-02-07 | End: 2019-03-08

## 2019-02-07 RX ORDER — SENNA PLUS 8.6 MG/1
2 TABLET ORAL
Qty: 0 | Refills: 0 | COMMUNITY
Start: 2019-02-07

## 2019-02-07 RX ORDER — PANTOPRAZOLE SODIUM 20 MG/1
1 TABLET, DELAYED RELEASE ORAL
Qty: 0 | Refills: 0 | COMMUNITY
Start: 2019-02-07

## 2019-02-07 RX ORDER — ACETAMINOPHEN 500 MG
1000 TABLET ORAL ONCE
Qty: 0 | Refills: 0 | Status: COMPLETED | OUTPATIENT
Start: 2019-02-07 | End: 2019-02-07

## 2019-02-07 RX ORDER — POLYETHYLENE GLYCOL 3350 17 G/17G
17 POWDER, FOR SOLUTION ORAL
Qty: 0 | Refills: 0 | COMMUNITY
Start: 2019-02-07

## 2019-02-07 RX ORDER — TRAMADOL HYDROCHLORIDE 50 MG/1
50 TABLET ORAL EVERY 6 HOURS
Qty: 0 | Refills: 0 | Status: DISCONTINUED | OUTPATIENT
Start: 2019-02-07 | End: 2019-02-11

## 2019-02-07 RX ORDER — OXYCODONE HYDROCHLORIDE 5 MG/1
1 TABLET ORAL
Qty: 0 | Refills: 0 | COMMUNITY
Start: 2019-02-07

## 2019-02-07 RX ADMIN — Medication 400 MILLIGRAM(S): at 12:03

## 2019-02-07 RX ADMIN — PANTOPRAZOLE SODIUM 40 MILLIGRAM(S): 20 TABLET, DELAYED RELEASE ORAL at 06:18

## 2019-02-07 RX ADMIN — OXYCODONE HYDROCHLORIDE 10 MILLIGRAM(S): 5 TABLET ORAL at 21:20

## 2019-02-07 RX ADMIN — HYDROMORPHONE HYDROCHLORIDE 0.5 MILLIGRAM(S): 2 INJECTION INTRAMUSCULAR; INTRAVENOUS; SUBCUTANEOUS at 21:56

## 2019-02-07 RX ADMIN — ENOXAPARIN SODIUM 40 MILLIGRAM(S): 100 INJECTION SUBCUTANEOUS at 11:03

## 2019-02-07 RX ADMIN — OXYCODONE HYDROCHLORIDE 10 MILLIGRAM(S): 5 TABLET ORAL at 20:31

## 2019-02-07 RX ADMIN — Medication 1 MILLIGRAM(S): at 11:03

## 2019-02-07 RX ADMIN — HYDROMORPHONE HYDROCHLORIDE 0.5 MILLIGRAM(S): 2 INJECTION INTRAMUSCULAR; INTRAVENOUS; SUBCUTANEOUS at 22:20

## 2019-02-07 RX ADMIN — Medication 500 MILLIGRAM(S): at 06:18

## 2019-02-07 RX ADMIN — TRAMADOL HYDROCHLORIDE 50 MILLIGRAM(S): 50 TABLET ORAL at 19:21

## 2019-02-07 RX ADMIN — OXYCODONE HYDROCHLORIDE 10 MILLIGRAM(S): 5 TABLET ORAL at 14:15

## 2019-02-07 RX ADMIN — OXYCODONE HYDROCHLORIDE 10 MILLIGRAM(S): 5 TABLET ORAL at 13:24

## 2019-02-07 RX ADMIN — Medication 500 MILLIGRAM(S): at 18:36

## 2019-02-07 RX ADMIN — Medication 1 TABLET(S): at 11:03

## 2019-02-07 RX ADMIN — Medication 1 TABLET(S): at 13:24

## 2019-02-07 NOTE — DISCHARGE NOTE ADULT - PLAN OF CARE
Return to ADL's IM Nail DC Instructions:    1.	Resume previous diet, regular or diabetic as appropriate  2.	Weight Bearing as Tolerated with assistance and rolling walker  3.	Continue DVT/PE Prophylaxis, Ecotrin 325mg twice daily for 30 days, do not skip doses. See Med Rec for Duration and dose.  4.	PT daily  5.	Follow up with Orthopedic Surgeon Dr Arce in 10-14 Days after Discharge from the Hospital. Call Office asap For Appointment.  6.	Staples to be removed Post-Op Day 14, provided wound is healed, no open areas or copious drainage.  7.	Ice the hip as much as possible  8.	Keep Aquacel bandage on Hip. Change only if wet or soiled using Tegaderm/Paper tape and dry gauze.

## 2019-02-07 NOTE — OCCUPATIONAL THERAPY INITIAL EVALUATION ADULT - ADDITIONAL COMMENTS
Pt lives with daughter in a prvt home 2 basilia and 12 steps to bedroom. PTI independent in all adl's. Shower stall in bathroom

## 2019-02-07 NOTE — PROGRESS NOTE ADULT - SUBJECTIVE AND OBJECTIVE BOX
Patient seen and examined at bedside. Pain is well controlled. no acute overnight events. NO other complaints. denies fever, chills, chest pain, sob.      Vital Signs Last 24 Hrs  T(C): 37.3 (07 Feb 2019 05:29), Max: 37.3 (07 Feb 2019 05:29)  T(F): 99.1 (07 Feb 2019 05:29), Max: 99.1 (07 Feb 2019 05:29)  HR: 80 (07 Feb 2019 05:29) (69 - 84)  BP: 105/64 (07 Feb 2019 05:29) (92/59 - 119/53)  BP(mean): --  RR: 18 (07 Feb 2019 05:29) (16 - 18)  SpO2: 95% (07 Feb 2019 05:29) (93% - 96%)        PE :    GEN: NAD    RLE:  dressing c/d/i  SILT L3-S1  EHL/FHL/GSC/TA intact  dp pulse 2+  compartments soft and compressible   no calf tenderness

## 2019-02-07 NOTE — DISCHARGE NOTE ADULT - CARE PROVIDER_API CALL
Brandyn Arce (MD)  Orthopaedic Surgery  [address]  [place] [address]  Phone: [phone]  Fax: [phone]  Follow Up Time:

## 2019-02-07 NOTE — DISCHARGE NOTE ADULT - CONDITIONS AT DISCHARGE
Pt is a&o x4. Pt denies any pain. Pt is ambulatory w/ assistance, voiding, tolerating diet, and VSS. IV lock removed and site WDL. Safety maintained. Pt being transported by ambullete.

## 2019-02-07 NOTE — DISCHARGE NOTE ADULT - PATIENT PORTAL LINK FT
You can access the [email or web address], offered by [facility], by registering with the following website: [email or web address]

## 2019-02-07 NOTE — PROGRESS NOTE ADULT - SUBJECTIVE AND OBJECTIVE BOX
83 y F community ambulator without assistive devices presents to the ED sp witnessed MF at her home earlier today by her daughter. Patient reports she had pain in her righ hip after the fall and was unable to ambulate. Denies HH or LOC. Patient has no significant PMH, does not follow up with a PCP. Patient does not follow up with an orthopedist. Patient denies any other injuries. Denies fever, chill, sob, chest pain, dizziness. Denies numbness or tingling of the RLE. No other complaints at this time. Patient seen resting comfortably post op. OOB to chair. Pain well controlled    MEDICATIONS  (STANDING):  ascorbic acid 500 milliGRAM(s) Oral two times a day  calcium carbonate 1250 mG  + Vitamin D (OsCal 500 + D) 1 Tablet(s) Oral daily  docusate sodium 100 milliGRAM(s) Oral three times a day  enoxaparin Injectable 40 milliGRAM(s) SubCutaneous daily  folic acid 1 milliGRAM(s) Oral daily  folic acid 1 milliGRAM(s) Oral daily  lactated ringers. 1000 milliLiter(s) (75 mL/Hr) IV Continuous <Continuous>  multivitamin 1 Tablet(s) Oral daily  multivitamin 1 Tablet(s) Oral daily  pantoprazole    Tablet 40 milliGRAM(s) Oral before breakfast  senna 2 Tablet(s) Oral at bedtime  traMADol 50 milliGRAM(s) Oral every 6 hours    MEDICATIONS  (PRN):  acetaminophen   Tablet .. 650 milliGRAM(s) Oral every 6 hours PRN Temp greater or equal to 38C (100.4F), Mild Pain (1 - 3)  HYDROmorphone  Injectable 0.5 milliGRAM(s) IV Push every 4 hours PRN severe breakthrough pain  oxyCODONE    IR 10 milliGRAM(s) Oral every 4 hours PRN Severe Pain (7 - 10)  oxyCODONE    IR 5 milliGRAM(s) Oral every 4 hours PRN Moderate Pain (4 - 6)  polyethylene glycol 3350 17 Gram(s) Oral daily PRN Constipation          VITALS:   T(C): 36.5 (02-07-19 @ 16:47), Max: 37.3 (02-07-19 @ 05:29)  HR: 67 (02-07-19 @ 16:47) (67 - 98)  BP: 96/62 (02-07-19 @ 16:47) (92/59 - 122/72)  RR: 18 (02-07-19 @ 16:47) (18 - 18)  SpO2: 94% (02-07-19 @ 16:47) (94% - 96%)  Wt(kg): --        LABS:        CBC Full  -  ( 07 Feb 2019 07:18 )  WBC Count : 9.7 K/uL  Hemoglobin : 9.7 g/dL  Hematocrit : 28.5 %  Platelet Count - Automated : 194 K/uL  Mean Cell Volume : 88.4 fl  Mean Cell Hemoglobin : 30.2 pg  Mean Cell Hemoglobin Concentration : 34.2 gm/dL  Auto Neutrophil # : x  Auto Lymphocyte # : x  Auto Monocyte # : x  Auto Eosinophil # : x  Auto Basophil # : x  Auto Neutrophil % : x  Auto Lymphocyte % : x  Auto Monocyte % : x  Auto Eosinophil % : x  Auto Basophil % : x    02-07    141  |  101  |  14  ----------------------------<  117<H>  4.7   |  30  |  0.52    Ca    8.4      07 Feb 2019 07:18            CAPILLARY BLOOD GLUCOSE          RADIOLOGY & ADDITIONAL TESTS:

## 2019-02-07 NOTE — DISCHARGE NOTE ADULT - CARE PLAN
Principal Discharge DX:	Intertrochanteric fracture  Goal:	Return to ADL's  Assessment and plan of treatment:	IM Nail DC Instructions:    1.	Resume previous diet, regular or diabetic as appropriate  2.	Weight Bearing as Tolerated with assistance and rolling walker  3.	Continue DVT/PE Prophylaxis, Ecotrin 325mg twice daily for 30 days, do not skip doses. See Med Rec for Duration and dose.  4.	PT daily  5.	Follow up with Orthopedic Surgeon Dr Arce in 10-14 Days after Discharge from the Hospital. Call Office asap For Appointment.  6.	Staples to be removed Post-Op Day 14, provided wound is healed, no open areas or copious drainage.  7.	Ice the hip as much as possible  8.	Keep Aquacel bandage on Hip. Change only if wet or soiled using Tegaderm/Paper tape and dry gauze.

## 2019-02-07 NOTE — DISCHARGE NOTE ADULT - HOSPITAL COURSE
H&P:    Pt is a 83y Female  PAST MEDICAL & SURGICAL HISTORY:  No pertinent past medical history  History of appendectomy  H/O:  section       Now s/p ORIF of Left Intertrochanteric Hip Fracture . Pt is afebrile with stable vital signs. Pain is controlled. Alert and Oriented. Exam reveals intact EHL FHL TA GS, +DP. Splint is clean and dry and intact.    Vital Signs Last 24 Hrs  T(C): 37.3 (19 @ 05:29), Max: 37.3 (19 @ 05:29)  T(F): 99.1 (19 @ 05:29), Max: 99.1 (19 @ 05:29)  HR: 80 (19 05:29) (69 - 84)  BP: 105/64 (19 @ 05:29) (92/59 - 119/53)  RR: 18 (19 @ 05:29) (16 - 18)  SpO2: 95% (19 05:29) (93% - 96%)                        9.7    9.7   )-----------( 194      ( 2019 07:18 )             28.5         Hospital Course:     The patient is a 83y Female status post Open Reduction Internal Fixation of a Left  Intertrochanteric Hip Fracture. Pt sustained injury from a fall was admitted through the ED. Prior to surgery Patient was medically Optimized. Prophylactic antibiotics were started within 30 minutes before the procedure and continued for 24 hours.  There were no complications during the procedure.  The patient was transferred to recovery room in stable condition and subsequently to the orthopedic floor.  Patient was placed on Lovenox for DVT/PE prophylaxis.  All home medications were continued.  Physical therapy daily and daily labs were followed.  Dressing was changed prior to discharge. The rest of the hospital stay was unremarkable and patient was discharged in stable condition to follow up as outpatient.

## 2019-02-07 NOTE — PROGRESS NOTE ADULT - ASSESSMENT
A/P: 83 F s/p R Hip IMN POD 2.     Analgesia  DVT ppx  encourage IS  Medicine recommended outpatient w/u of syncope   WBAT  PT/OT  DC planning  will d/w attending and advise if plan changes.

## 2019-02-07 NOTE — DISCHARGE NOTE ADULT - MEDICATION SUMMARY - MEDICATIONS TO TAKE
I will START or STAY ON the medications listed below when I get home from the hospital:    Ecotrin 325 mg oral delayed release tablet  -- 1 tab(s) by mouth 2 times a day for dvt ppx MDD:2  -- Swallow whole.  Do not crush.  Take with food or milk.    -- Indication: For for blood clot prevention, do not skip doses    oxyCODONE 10 mg oral tablet  -- 1 tab(s) by mouth every 4 hours, As needed, Severe Pain (7 - 10)  -- Indication: For Prn for severe pain    oxyCODONE 5 mg oral tablet  -- 1 tab(s) by mouth every 4 hours, As needed, Moderate Pain (4 - 6)  -- Indication: For Prn for mild pain    polyethylene glycol 3350 oral powder for reconstitution  -- 17 gram(s) by mouth once a day, As needed, Constipation  -- Indication: For Prn for constipation    senna oral tablet  -- 2 tab(s) by mouth once a day (at bedtime)  -- Indication: For Prn for constipation    pantoprazole 40 mg oral delayed release tablet  -- 1 tab(s) by mouth once a day (before a meal)  -- Indication: For Prn for dyspepsia    calcium-vitamin D 500 mg-200 intl units oral tablet  -- 1 tab(s) by mouth once a day  -- Indication: For multivitamin    Multiple Vitamins oral tablet  -- 1 tab(s) by mouth once a day  -- Indication: For multivitamin    Multiple Vitamins oral tablet  -- 1 tab(s) by mouth once a day  -- Indication: For multivitamin

## 2019-02-08 LAB
ANION GAP SERPL CALC-SCNC: 10 MMOL/L — SIGNIFICANT CHANGE UP (ref 5–17)
BUN SERPL-MCNC: 11 MG/DL — SIGNIFICANT CHANGE UP (ref 7–23)
CALCIUM SERPL-MCNC: 8.4 MG/DL — SIGNIFICANT CHANGE UP (ref 8.4–10.5)
CHLORIDE SERPL-SCNC: 99 MMOL/L — SIGNIFICANT CHANGE UP (ref 96–108)
CO2 SERPL-SCNC: 29 MMOL/L — SIGNIFICANT CHANGE UP (ref 22–31)
CREAT SERPL-MCNC: 0.44 MG/DL — LOW (ref 0.5–1.3)
GLUCOSE SERPL-MCNC: 140 MG/DL — HIGH (ref 70–99)
HCT VFR BLD CALC: 26.8 % — LOW (ref 34.5–45)
HGB BLD-MCNC: 9.3 G/DL — LOW (ref 11.5–15.5)
MCHC RBC-ENTMCNC: 30.8 PG — SIGNIFICANT CHANGE UP (ref 27–34)
MCHC RBC-ENTMCNC: 34.6 GM/DL — SIGNIFICANT CHANGE UP (ref 32–36)
MCV RBC AUTO: 88.9 FL — SIGNIFICANT CHANGE UP (ref 80–100)
PLATELET # BLD AUTO: 227 K/UL — SIGNIFICANT CHANGE UP (ref 150–400)
POTASSIUM SERPL-MCNC: 4.6 MMOL/L — SIGNIFICANT CHANGE UP (ref 3.5–5.3)
POTASSIUM SERPL-SCNC: 4.6 MMOL/L — SIGNIFICANT CHANGE UP (ref 3.5–5.3)
RBC # BLD: 3.01 M/UL — LOW (ref 3.8–5.2)
RBC # FLD: 12 % — SIGNIFICANT CHANGE UP (ref 10.3–14.5)
SODIUM SERPL-SCNC: 138 MMOL/L — SIGNIFICANT CHANGE UP (ref 135–145)
WBC # BLD: 9.5 K/UL — SIGNIFICANT CHANGE UP (ref 3.8–10.5)
WBC # FLD AUTO: 9.5 K/UL — SIGNIFICANT CHANGE UP (ref 3.8–10.5)

## 2019-02-08 RX ADMIN — Medication 100 MILLIGRAM(S): at 13:28

## 2019-02-08 RX ADMIN — Medication 1 TABLET(S): at 13:28

## 2019-02-08 RX ADMIN — Medication 500 MILLIGRAM(S): at 05:59

## 2019-02-08 RX ADMIN — Medication 1 MILLIGRAM(S): at 13:28

## 2019-02-08 RX ADMIN — TRAMADOL HYDROCHLORIDE 50 MILLIGRAM(S): 50 TABLET ORAL at 17:47

## 2019-02-08 RX ADMIN — OXYCODONE HYDROCHLORIDE 5 MILLIGRAM(S): 5 TABLET ORAL at 09:12

## 2019-02-08 RX ADMIN — TRAMADOL HYDROCHLORIDE 50 MILLIGRAM(S): 50 TABLET ORAL at 23:44

## 2019-02-08 RX ADMIN — TRAMADOL HYDROCHLORIDE 50 MILLIGRAM(S): 50 TABLET ORAL at 05:59

## 2019-02-08 RX ADMIN — PANTOPRAZOLE SODIUM 40 MILLIGRAM(S): 20 TABLET, DELAYED RELEASE ORAL at 05:59

## 2019-02-08 RX ADMIN — TRAMADOL HYDROCHLORIDE 50 MILLIGRAM(S): 50 TABLET ORAL at 06:36

## 2019-02-08 RX ADMIN — Medication 100 MILLIGRAM(S): at 21:02

## 2019-02-08 RX ADMIN — OXYCODONE HYDROCHLORIDE 5 MILLIGRAM(S): 5 TABLET ORAL at 10:00

## 2019-02-08 RX ADMIN — ENOXAPARIN SODIUM 40 MILLIGRAM(S): 100 INJECTION SUBCUTANEOUS at 13:29

## 2019-02-08 RX ADMIN — SENNA PLUS 2 TABLET(S): 8.6 TABLET ORAL at 21:02

## 2019-02-08 RX ADMIN — TRAMADOL HYDROCHLORIDE 50 MILLIGRAM(S): 50 TABLET ORAL at 13:28

## 2019-02-08 RX ADMIN — Medication 500 MILLIGRAM(S): at 17:47

## 2019-02-08 NOTE — PROGRESS NOTE ADULT - ASSESSMENT
84 yo woman s/p ORIF of the right hip. Patient resting comfortably OOB in chair.  She is right IT Fx with IM Nail.  Denies sob hcest pain   Feels a little light headed and has had headache prior to hospitalization with negative Ct of Brain

## 2019-02-08 NOTE — PROGRESS NOTE ADULT - SUBJECTIVE AND OBJECTIVE BOX
83 y F community ambulator without assistive devices presents to the ED sp witnessed MF at her home earlier today by her daughter. Patient reports she had pain in her righ hip after the fall and was unable to ambulate. Denies HH or LOC. Patient has no significant PMH, does not follow up with a PCP. Patient does not follow up with an orthopedist. Patient denies any other injuries. Denies fever, chill, sob, chest pain, dizziness. Denies numbness or tingling of the RLE. No other complaints at this time. Patient seen resting comfortably post op. OOB to chair. Pain well controlled    MEDICATIONS  (STANDING):  ascorbic acid 500 milliGRAM(s) Oral two times a day  calcium carbonate 1250 mG  + Vitamin D (OsCal 500 + D) 1 Tablet(s) Oral daily  docusate sodium 100 milliGRAM(s) Oral three times a day  enoxaparin Injectable 40 milliGRAM(s) SubCutaneous daily  folic acid 1 milliGRAM(s) Oral daily  folic acid 1 milliGRAM(s) Oral daily  lactated ringers. 1000 milliLiter(s) (75 mL/Hr) IV Continuous <Continuous>  multivitamin 1 Tablet(s) Oral daily  multivitamin 1 Tablet(s) Oral daily  pantoprazole    Tablet 40 milliGRAM(s) Oral before breakfast  senna 2 Tablet(s) Oral at bedtime  traMADol 50 milliGRAM(s) Oral every 6 hours    MEDICATIONS  (PRN):  acetaminophen   Tablet .. 650 milliGRAM(s) Oral every 6 hours PRN Temp greater or equal to 38C (100.4F), Mild Pain (1 - 3)  HYDROmorphone  Injectable 0.5 milliGRAM(s) IV Push every 4 hours PRN severe breakthrough pain  oxyCODONE    IR 10 milliGRAM(s) Oral every 4 hours PRN Severe Pain (7 - 10)  oxyCODONE    IR 5 milliGRAM(s) Oral every 4 hours PRN Moderate Pain (4 - 6)  polyethylene glycol 3350 17 Gram(s) Oral daily PRN Constipation          Vital Signs Last 24 Hrs  T(C): 36.3 (08 Feb 2019 18:30), Max: 37.5 (08 Feb 2019 06:26)  T(F): 97.4 (08 Feb 2019 18:30), Max: 99.5 (08 Feb 2019 06:26)  HR: 93 (08 Feb 2019 18:30) (84 - 94)  BP: 98/68 (08 Feb 2019 18:30) (98/68 - 168/75)  BP(mean): --  RR: 18 (08 Feb 2019 18:30) (17 - 18)  SpO2: 92% (08 Feb 2019 18:30) (90% - 96%)    PHYSICAL EXAM:    GENERAL: NAD, well nourished and conversant  HEAD:  Atraumatic  EYES: EOM, PERRLA, conjunctiva pink and sclera white  ENT: No tonsillar erythema, exudates, or enlargement, moist mucous membranes, good dentition, no lesions  NECK: Supple, No JVD, normal thyroid, carotids with normal upstrokes and no bruits  CHEST/LUNG: Clear to auscultation bilaterally, No rales, rhonchi, wheezing, or rubs  HEART: Regular rate and rhythm, No murmurs, rubs, or gallops  ABDOMEN: Soft, nondistended, no masses, guarding, tenderness or rebound, bowel sounds present  EXTREMITIES:  2+ Peripheral Pulses, No clubbing, cyanosis, or edema.  s/p RIGHT  IT Fx IMN  SKIN: No rashes or lesions  NERVOUS SYSTEM:  Alert & Oriented X3, normal cognitive function. Motor Strength 5/5 right upper and right lower.  5/5 left upper and left lower extremities, DTRs 2+ intact and symmetric              LABS:            CBC Full  -  ( 08 Feb 2019 06:50 )  WBC Count : 9.5 K/uL  Hemoglobin : 9.3 g/dL  Hematocrit : 26.8 %  Platelet Count - Automated : 227 K/uL  Mean Cell Volume : 88.9 fl  Mean Cell Hemoglobin : 30.8 pg  Mean Cell Hemoglobin Concentration : 34.6 gm/dL  Auto Neutrophil # : x  Auto Lymphocyte # : x  Auto Monocyte # : x  Auto Eosinophil # : x  Auto Basophil # : x  Auto Neutrophil % : x  Auto Lymphocyte % : x  Auto Monocyte % : x  Auto Eosinophil % : x  Auto Basophil % : x    02-08    138  |  99  |  11  ----------------------------<  140<H>  4.6   |  29  |  0.44<L>    Ca    8.4      08 Feb 2019 06:50                  CAPILLARY BLOOD GLUCOSE          RADIOLOGY & ADDITIONAL TESTS:

## 2019-02-08 NOTE — PROGRESS NOTE ADULT - SUBJECTIVE AND OBJECTIVE BOX
Ortho Progress Note    S: Patient seen and examined. No acute events overnight. Pain well controlled with current regimen. Denies lightheadedness/dizziness, CP/SOB. Tolerating diet.       O:  Physical Exam:  Gen: Laying in bed, NAD, alert and oriented.   Resp: Unlabored breathing  Ext: EHL/FHL/TA/Sol intact          + SILT DP/SP/HOROWITZ/Sa          +DP, extremity WWP  Dressing c/d/i    Vital Signs Last 24 Hrs  T(C): 37.5 (08 Feb 2019 06:26), Max: 37.5 (08 Feb 2019 06:26)  T(F): 99.5 (08 Feb 2019 06:26), Max: 99.5 (08 Feb 2019 06:26)  HR: 84 (08 Feb 2019 06:26) (67 - 98)  BP: 100/62 (08 Feb 2019 06:26) (96/62 - 137/63)  BP(mean): --  RR: 18 (08 Feb 2019 06:26) (18 - 18)  SpO2: 95% (08 Feb 2019 06:26) (94% - 95%)                          9.7    9.7   )-----------( 194      ( 07 Feb 2019 07:18 )             28.5                         12.1   12.1  )-----------( 222      ( 06 Feb 2019 08:47 )             35.2       02-07    141  |  101  |  14  ----------------------------<  117<H>  4.7   |  30  |  0.52              A/P  83y Female w/ R IT Fx s/p R IMN (2/5)    -WBAT RLE  -DVT Ppx: Lovenox  -pain control  -Diet: reg  -Dispo: rehab

## 2019-02-09 RX ADMIN — TRAMADOL HYDROCHLORIDE 50 MILLIGRAM(S): 50 TABLET ORAL at 07:13

## 2019-02-09 RX ADMIN — TRAMADOL HYDROCHLORIDE 50 MILLIGRAM(S): 50 TABLET ORAL at 06:13

## 2019-02-09 RX ADMIN — SENNA PLUS 2 TABLET(S): 8.6 TABLET ORAL at 23:03

## 2019-02-09 RX ADMIN — TRAMADOL HYDROCHLORIDE 50 MILLIGRAM(S): 50 TABLET ORAL at 00:44

## 2019-02-09 RX ADMIN — Medication 100 MILLIGRAM(S): at 06:15

## 2019-02-09 RX ADMIN — OXYCODONE HYDROCHLORIDE 5 MILLIGRAM(S): 5 TABLET ORAL at 15:34

## 2019-02-09 RX ADMIN — Medication 500 MILLIGRAM(S): at 06:14

## 2019-02-09 RX ADMIN — Medication 1 TABLET(S): at 12:26

## 2019-02-09 RX ADMIN — TRAMADOL HYDROCHLORIDE 50 MILLIGRAM(S): 50 TABLET ORAL at 23:01

## 2019-02-09 RX ADMIN — TRAMADOL HYDROCHLORIDE 50 MILLIGRAM(S): 50 TABLET ORAL at 18:16

## 2019-02-09 RX ADMIN — Medication 1 MILLIGRAM(S): at 12:26

## 2019-02-09 RX ADMIN — TRAMADOL HYDROCHLORIDE 50 MILLIGRAM(S): 50 TABLET ORAL at 12:26

## 2019-02-09 RX ADMIN — PANTOPRAZOLE SODIUM 40 MILLIGRAM(S): 20 TABLET, DELAYED RELEASE ORAL at 06:13

## 2019-02-09 RX ADMIN — ENOXAPARIN SODIUM 40 MILLIGRAM(S): 100 INJECTION SUBCUTANEOUS at 12:26

## 2019-02-09 RX ADMIN — OXYCODONE HYDROCHLORIDE 5 MILLIGRAM(S): 5 TABLET ORAL at 16:15

## 2019-02-09 RX ADMIN — Medication 100 MILLIGRAM(S): at 23:03

## 2019-02-09 RX ADMIN — OXYCODONE HYDROCHLORIDE 5 MILLIGRAM(S): 5 TABLET ORAL at 05:04

## 2019-02-09 NOTE — PROGRESS NOTE ADULT - ASSESSMENT
82 yo woman s/p ORIF of the right hip. Patient resting comfortably OOB in chair.  She is right IT Fx with IM Nail.  Denies sob hcest pain   Feels a little light headed and has had headache prior to hospitalization with negative Ct of Brain When brought to the emergency room with inability to ambulate and xray confirmed a right intertrochanteric fracture. History of headaches and pre-op Brain Ct wasnegative. The patient underwent surgical ORIF with a right IM nail on 02/05/19. Patient now seen resting comfortably post op. OOB to chair and beginning to ambulate with physical therapy.  Pains from right hip and her chronic headache are well controlled, at present.

## 2019-02-09 NOTE — PROGRESS NOTE ADULT - SUBJECTIVE AND OBJECTIVE BOX
83 y F community ambulator without assistive devices presents to the ED sp witnessed MF at her home earlier today by her daughter. Patient reports she had pain in her righ hip after the fall and was unable to ambulate. Denies HH or LOC. Patient has no significant PMH, does not follow up with a PCP. Patient does not follow up with an orthopedist. Patient denies any other injuries. Denies fever, chill, sob, chest pain, dizziness. Denies numbness or tingling of the RLE. No other complaints at this time. Patient seen resting comfortably post op. OOB to chair. Pain well controlled    MEDICATIONS  (STANDING):  ascorbic acid 500 milliGRAM(s) Oral two times a day  calcium carbonate 1250 mG  + Vitamin D (OsCal 500 + D) 1 Tablet(s) Oral daily  docusate sodium 100 milliGRAM(s) Oral three times a day  enoxaparin Injectable 40 milliGRAM(s) SubCutaneous daily  folic acid 1 milliGRAM(s) Oral daily  folic acid 1 milliGRAM(s) Oral daily  lactated ringers. 1000 milliLiter(s) (75 mL/Hr) IV Continuous <Continuous>  multivitamin 1 Tablet(s) Oral daily  multivitamin 1 Tablet(s) Oral daily  pantoprazole    Tablet 40 milliGRAM(s) Oral before breakfast  senna 2 Tablet(s) Oral at bedtime  traMADol 50 milliGRAM(s) Oral every 6 hours    MEDICATIONS  (PRN):  acetaminophen   Tablet .. 650 milliGRAM(s) Oral every 6 hours PRN Temp greater or equal to 38C (100.4F), Mild Pain (1 - 3)  HYDROmorphone  Injectable 0.5 milliGRAM(s) IV Push every 4 hours PRN severe breakthrough pain  oxyCODONE    IR 10 milliGRAM(s) Oral every 4 hours PRN Severe Pain (7 - 10)  oxyCODONE    IR 5 milliGRAM(s) Oral every 4 hours PRN Moderate Pain (4 - 6)  polyethylene glycol 3350 17 Gram(s) Oral daily PRN Constipation          Vital Signs Last 24 Hrs  T(C): 36.3 (08 Feb 2019 18:30), Max: 37.5 (08 Feb 2019 06:26)  T(F): 97.4 (08 Feb 2019 18:30), Max: 99.5 (08 Feb 2019 06:26)  HR: 93 (08 Feb 2019 18:30) (84 - 94)  BP: 98/68 (08 Feb 2019 18:30) (98/68 - 168/75)  BP(mean): --  RR: 18 (08 Feb 2019 18:30) (17 - 18)  SpO2: 92% (08 Feb 2019 18:30) (90% - 96%)    PHYSICAL EXAM:    GENERAL: NAD, well nourished and conversant  HEAD:  Atraumatic  EYES: EOM, PERRLA, conjunctiva pink and sclera white  ENT: No tonsillar erythema, exudates, or enlargement, moist mucous membranes, good dentition, no lesions  NECK: Supple, No JVD, normal thyroid, carotids with normal upstrokes and no bruits  CHEST/LUNG: Clear to auscultation bilaterally, No rales, rhonchi, wheezing, or rubs  HEART: Regular rate and rhythm, No murmurs, rubs, or gallops  ABDOMEN: Soft, nondistended, no masses, guarding, tenderness or rebound, bowel sounds present  EXTREMITIES:  2+ Peripheral Pulses, No clubbing, cyanosis, or edema.  s/p RIGHT  IT Fx IMN  SKIN: No rashes or lesions  NERVOUS SYSTEM:  Alert & Oriented X3, normal cognitive function. Motor Strength 5/5 right upper and right lower.  5/5 left upper and left lower extremities, DTRs 2+ intact and symmetric              LABS:            CBC Full  -  ( 08 Feb 2019 06:50 )  WBC Count : 9.5 K/uL  Hemoglobin : 9.3 g/dL  Hematocrit : 26.8 %  Platelet Count - Automated : 227 K/uL  Mean Cell Volume : 88.9 fl  Mean Cell Hemoglobin : 30.8 pg  Mean Cell Hemoglobin Concentration : 34.6 gm/dL  Auto Neutrophil # : x  Auto Lymphocyte # : x  Auto Monocyte # : x  Auto Eosinophil # : x  Auto Basophil # : x  Auto Neutrophil % : x  Auto Lymphocyte % : x  Auto Monocyte % : x  Auto Eosinophil % : x  Auto Basophil % : x    02-08    138  |  99  |  11  ----------------------------<  140<H>  4.6   |  29  |  0.44<L>    Ca    8.4      08 Feb 2019 06:50                  CAPILLARY BLOOD GLUCOSE          RADIOLOGY & ADDITIONAL TESTS: 83 y F community ambulator without assistive devices presents to the ED sp witnessed MF at her home earlier today by her daughter. Patient reports she had pain in her righ hip after the fall and was unable to ambulate. Denies HH or LOC. Patient has no significant PMH, does not follow up with a PCP. Patient does not follow up with an orthopedist. Patient denies any other injuries. Denies fever, chill, sob, chest pain, dizziness. Denies numbness or tingling of the RLE. No other complaints at this time. Patient seen resting comfortably post op. OOB to chair. Pain well controlled    MEDICATIONS  (STANDING):  ascorbic acid 500 milliGRAM(s) Oral two times a day  calcium carbonate 1250 mG  + Vitamin D (OsCal 500 + D) 1 Tablet(s) Oral daily  docusate sodium 100 milliGRAM(s) Oral three times a day  enoxaparin Injectable 40 milliGRAM(s) SubCutaneous daily  folic acid 1 milliGRAM(s) Oral daily  folic acid 1 milliGRAM(s) Oral daily  lactated ringers. 1000 milliLiter(s) (75 mL/Hr) IV Continuous <Continuous>  multivitamin 1 Tablet(s) Oral daily  multivitamin 1 Tablet(s) Oral daily  pantoprazole    Tablet 40 milliGRAM(s) Oral before breakfast  senna 2 Tablet(s) Oral at bedtime  traMADol 50 milliGRAM(s) Oral every 6 hours    MEDICATIONS  (PRN):  acetaminophen   Tablet .. 650 milliGRAM(s) Oral every 6 hours PRN Temp greater or equal to 38C (100.4F), Mild Pain (1 - 3)  HYDROmorphone  Injectable 0.5 milliGRAM(s) IV Push every 4 hours PRN severe breakthrough pain  oxyCODONE    IR 10 milliGRAM(s) Oral every 4 hours PRN Severe Pain (7 - 10)  oxyCODONE    IR 5 milliGRAM(s) Oral every 4 hours PRN Moderate Pain (4 - 6)  polyethylene glycol 3350 17 Gram(s) Oral daily PRN Constipation    Vital Signs Last 24 Hrs  T(C): 36.8 (09 Feb 2019 21:36), Max: 36.8 (09 Feb 2019 21:36)  T(F): 98.3 (09 Feb 2019 21:36), Max: 98.3 (09 Feb 2019 21:36)  HR: 82 (09 Feb 2019 21:36) (72 - 96)  BP: 106/65 (09 Feb 2019 21:36) (106/65 - 116/76)  BP(mean): --  RR: 18 (09 Feb 2019 21:36) (18 - 18)  SpO2: 96% (09 Feb 2019 21:36) (95% - 98%)        PHYSICAL EXAM:    GENERAL: NAD, well nourished and conversant  HEAD:  Atraumatic  EYES: EOM, PERRLA, conjunctiva pink and sclera white  ENT: No tonsillar erythema, exudates, or enlargement, moist mucous membranes, good dentition, no lesions  NECK: Supple, No JVD, normal thyroid, carotids with normal upstrokes and no bruits  CHEST/LUNG: Clear to auscultation bilaterally, No rales, rhonchi, wheezing, or rubs  HEART: Regular rate and rhythm, No murmurs, rubs, or gallops  ABDOMEN: Soft, nondistended, no masses, guarding, tenderness or rebound, bowel sounds present  EXTREMITIES:  2+ Peripheral Pulses, No clubbing, cyanosis, or edema.  s/p RIGHT  IT Fx IMN  SKIN: No rashes or lesions  NERVOUS SYSTEM:  Alert & Oriented X3, normal cognitive function. Motor Strength 5/5 right upper and right lower.  5/5 left upper and left lower extremities, DTRs 2+ intact and symmetric              LABS:          CBC Full  -  ( 08 Feb 2019 06:50 )  WBC Count : 9.5 K/uL  Hemoglobin : 9.3 g/dL  Hematocrit : 26.8 %  Platelet Count - Automated : 227 K/uL  Mean Cell Volume : 88.9 fl  Mean Cell Hemoglobin : 30.8 pg  Mean Cell Hemoglobin Concentration : 34.6 gm/dL  Auto Neutrophil # : x  Auto Lymphocyte # : x  Auto Monocyte # : x  Auto Eosinophil # : x  Auto Basophil # : x  Auto Neutrophil % : x  Auto Lymphocyte % : x  Auto Monocyte % : x  Auto Eosinophil % : x  Auto Basophil % : x    02-08    138  |  99  |  11  ----------------------------<  140<H>  4.6   |  29  |  0.44<L>    Ca    8.4      08 Feb 2019 06:50 83 y F community ambulator without assistive devices presents to the ED sp witnessed MF at her home earlier today by her daughter. Patient reports she had pain in her right hip after the fall and was unable to ambulate. Denies HH or LOC. Patient has no significant PMH, does not follow up with a PCP. Patient does not follow up with an orthopedist. Patient denies any other injuries. Denies fever, chill, sob, chest pain, dizziness. Denies numbness or tingling of the RLE. No other complaints at this time. Brought to the emergency room with inability to ambulate and xray confirmed a right intertrochanteric fracture. The patient underwent surgical ORIF with a right IM nail on 02/05/19. Patient now seen resting comfortably post op. OOB to chair and beginning to ambulate with physical therapy.  Pains from right hip and her chronic headache are well controlled, at present.    MEDICATIONS  (STANDING):  ascorbic acid 500 milliGRAM(s) Oral two times a day  calcium carbonate 1250 mG  + Vitamin D (OsCal 500 + D) 1 Tablet(s) Oral daily  docusate sodium 100 milliGRAM(s) Oral three times a day  enoxaparin Injectable 40 milliGRAM(s) SubCutaneous daily  folic acid 1 milliGRAM(s) Oral daily  folic acid 1 milliGRAM(s) Oral daily  lactated ringers. 1000 milliLiter(s) (75 mL/Hr) IV Continuous <Continuous>  multivitamin 1 Tablet(s) Oral daily  multivitamin 1 Tablet(s) Oral daily  pantoprazole    Tablet 40 milliGRAM(s) Oral before breakfast  senna 2 Tablet(s) Oral at bedtime  traMADol 50 milliGRAM(s) Oral every 6 hours    MEDICATIONS  (PRN):  acetaminophen   Tablet .. 650 milliGRAM(s) Oral every 6 hours PRN Temp greater or equal to 38C (100.4F), Mild Pain (1 - 3)  HYDROmorphone  Injectable 0.5 milliGRAM(s) IV Push every 4 hours PRN severe breakthrough pain  oxyCODONE    IR 10 milliGRAM(s) Oral every 4 hours PRN Severe Pain (7 - 10)  oxyCODONE    IR 5 milliGRAM(s) Oral every 4 hours PRN Moderate Pain (4 - 6)  polyethylene glycol 3350 17 Gram(s) Oral daily PRN Constipation    Vital Signs Last 24 Hrs  T(C): 36.8 (09 Feb 2019 21:36), Max: 36.8 (09 Feb 2019 21:36)  T(F): 98.3 (09 Feb 2019 21:36), Max: 98.3 (09 Feb 2019 21:36)  HR: 82 (09 Feb 2019 21:36) (72 - 96)  BP: 106/65 (09 Feb 2019 21:36) (106/65 - 116/76)  BP(mean): --  RR: 18 (09 Feb 2019 21:36) (18 - 18)  SpO2: 96% (09 Feb 2019 21:36) (95% - 98%)        PHYSICAL EXAM:    GENERAL: NAD, well nourished and conversant  HEAD:  Atraumatic  EYES: EOM, PERRLA, conjunctiva pink and sclera white  ENT: No tonsillar erythema, exudates, or enlargement, moist mucous membranes, good dentition, no lesions  NECK: Supple, No JVD, normal thyroid, carotids with normal upstrokes and no bruits  CHEST/LUNG: Clear to auscultation bilaterally, No rales, rhonchi, wheezing, or rubs  HEART: Regular rate and rhythm, No murmurs, rubs, or gallops  ABDOMEN: Soft, nondistended, no masses, guarding, tenderness or rebound, bowel sounds present  EXTREMITIES:  2+ Peripheral Pulses, No clubbing, cyanosis, or edema.  s/p RIGHT  IT Fx IMN  SKIN: No rashes or lesions  NERVOUS SYSTEM:  Alert & Oriented X3, normal cognitive function. Motor Strength 5/5 right upper and right lower.  5/5 left upper and left lower extremities, DTRs 2+ intact and symmetric              LABS:          CBC Full  -  ( 08 Feb 2019 06:50 )  WBC Count : 9.5 K/uL  Hemoglobin : 9.3 g/dL  Hematocrit : 26.8 %  Platelet Count - Automated : 227 K/uL  Mean Cell Volume : 88.9 fl  Mean Cell Hemoglobin : 30.8 pg  Mean Cell Hemoglobin Concentration : 34.6 gm/dL  Auto Neutrophil # : x  Auto Lymphocyte # : x  Auto Monocyte # : x  Auto Eosinophil # : x  Auto Basophil # : x  Auto Neutrophil % : x  Auto Lymphocyte % : x  Auto Monocyte % : x  Auto Eosinophil % : x  Auto Basophil % : x    02-08    138  |  99  |  11  ----------------------------<  140<H>  4.6   |  29  |  0.44<L>    Ca    8.4      08 Feb 2019 06:50

## 2019-02-09 NOTE — PROGRESS NOTE ADULT - SUBJECTIVE AND OBJECTIVE BOX
Ortho Progress Note    S: Patient seen and examined. No acute events overnight. Pt doing well, denies cp/sob      O:  Physical Exam:  Gen: Laying in bed, NAD, alert and oriented.   Resp: Unlabored breathing  Ext: EHL/FHL/TA/Sol intact          + SILT DP/SP/HOROWITZ/Sa          +DP, extremity WWP  Dressing c/d/i      Vital Signs Last 24 Hrs  T(C): 36.7 (02-09-19 @ 04:52), Max: 37.3 (02-08-19 @ 10:52)  T(F): 98.1 (02-09-19 @ 04:52), Max: 99.1 (02-08-19 @ 10:52)  HR: 72 (02-09-19 @ 04:52) (72 - 94)  BP: 106/66 (02-09-19 @ 04:52) (98/68 - 168/75)  BP(mean): --  RR: 18 (02-09-19 @ 04:52) (17 - 18)  SpO2: 98% (02-09-19 @ 04:52) (90% - 98%)                          9.3    9.5   )-----------( 227      ( 08 Feb 2019 06:50 )             26.8     02-08    138  |  99  |  11  ----------------------------<  140<H>  4.6   |  29  |  0.44<L>    Ca    8.4      08 Feb 2019 06:50            A/P  83y Female w/ R IT Fx s/p R IMN POD 4    -WBAT RLE  -DVT Ppx: Lovenox  - will dc on ASA  -pain control  -Diet: reg  -Dispo: rehab today 2/9

## 2019-02-10 RX ADMIN — TRAMADOL HYDROCHLORIDE 50 MILLIGRAM(S): 50 TABLET ORAL at 00:01

## 2019-02-10 RX ADMIN — Medication 1 TABLET(S): at 11:52

## 2019-02-10 RX ADMIN — TRAMADOL HYDROCHLORIDE 50 MILLIGRAM(S): 50 TABLET ORAL at 06:38

## 2019-02-10 RX ADMIN — Medication 1 MILLIGRAM(S): at 11:52

## 2019-02-10 RX ADMIN — TRAMADOL HYDROCHLORIDE 50 MILLIGRAM(S): 50 TABLET ORAL at 18:34

## 2019-02-10 RX ADMIN — Medication 100 MILLIGRAM(S): at 05:39

## 2019-02-10 RX ADMIN — TRAMADOL HYDROCHLORIDE 50 MILLIGRAM(S): 50 TABLET ORAL at 05:38

## 2019-02-10 RX ADMIN — TRAMADOL HYDROCHLORIDE 50 MILLIGRAM(S): 50 TABLET ORAL at 19:15

## 2019-02-10 RX ADMIN — Medication 500 MILLIGRAM(S): at 18:35

## 2019-02-10 RX ADMIN — Medication 100 MILLIGRAM(S): at 11:56

## 2019-02-10 RX ADMIN — TRAMADOL HYDROCHLORIDE 50 MILLIGRAM(S): 50 TABLET ORAL at 12:45

## 2019-02-10 RX ADMIN — ENOXAPARIN SODIUM 40 MILLIGRAM(S): 100 INJECTION SUBCUTANEOUS at 11:52

## 2019-02-10 RX ADMIN — TRAMADOL HYDROCHLORIDE 50 MILLIGRAM(S): 50 TABLET ORAL at 11:56

## 2019-02-10 RX ADMIN — Medication 500 MILLIGRAM(S): at 05:39

## 2019-02-10 RX ADMIN — PANTOPRAZOLE SODIUM 40 MILLIGRAM(S): 20 TABLET, DELAYED RELEASE ORAL at 05:39

## 2019-02-10 RX ADMIN — Medication 1 TABLET(S): at 11:51

## 2019-02-10 NOTE — PROGRESS NOTE ADULT - SUBJECTIVE AND OBJECTIVE BOX
Ortho Progress Note    S: Patient seen and examined. No acute events overnight. Pt doing well, denies cp/sob      O:  Physical Exam:  Gen: Laying in bed, NAD, alert and oriented.   Resp: Unlabored breathing  Ext: EHL/FHL/TA/Sol intact          + SILT DP/SP/HOROWITZ/Sa          +DP, extremity WWP  Dressing c/d/i      Vital Signs Last 24 Hrs  T(C): 36.6 (10 Feb 2019 01:39), Max: 36.8 (09 Feb 2019 21:36)  T(F): 97.9 (10 Feb 2019 01:39), Max: 98.3 (09 Feb 2019 21:36)  HR: 80 (10 Feb 2019 01:39) (80 - 96)  BP: 116/73 (10 Feb 2019 01:39) (106/65 - 116/76)  BP(mean): --  RR: 18 (10 Feb 2019 01:39) (18 - 18)  SpO2: 97% (10 Feb 2019 01:39) (95% - 97%)                          9.3    9.5   )-----------( 227      ( 08 Feb 2019 06:50 )             26.8     02-08    138  |  99  |  11  ----------------------------<  140<H>  4.6   |  29  |  0.44<L>    Ca    8.4      08 Feb 2019 06:50                           A/P  83y Female w/ R IT Fx s/p R IMN POD 5    -WBAT RLE  -DVT Ppx: Lovenox  - will dc on ASA  -pain control  -Diet: reg  -Dispo: rehab pending

## 2019-02-10 NOTE — PROGRESS NOTE ADULT - SUBJECTIVE AND OBJECTIVE BOX
83 y F community ambulator without assistive devices presents to the ED sp witnessed MF at her home earlier today by her daughter. Patient reports she had pain in her right hip after the fall and was unable to ambulate. Denies HH or LOC. Patient has no significant PMH, does not follow up with a PCP. Patient does not follow up with an orthopedist. Patient denies any other injuries. Denies fever, chill, sob, chest pain, dizziness. Denies numbness or tingling of the RLE. No other complaints at this time. Brought to the emergency room with inability to ambulate and xray confirmed a right intertrochanteric fracture. The patient underwent surgical ORIF with a right IM nail on 02/05/19. Patient now seen resting comfortably post op. OOB to chair and beginning to ambulate with physical therapy.  Pains from right hip and her chronic headache are well controlled, at present.    MEDICATIONS  (STANDING):  ascorbic acid 500 milliGRAM(s) Oral two times a day  calcium carbonate 1250 mG  + Vitamin D (OsCal 500 + D) 1 Tablet(s) Oral daily  docusate sodium 100 milliGRAM(s) Oral three times a day  enoxaparin Injectable 40 milliGRAM(s) SubCutaneous daily  folic acid 1 milliGRAM(s) Oral daily  folic acid 1 milliGRAM(s) Oral daily  lactated ringers. 1000 milliLiter(s) (75 mL/Hr) IV Continuous <Continuous>  multivitamin 1 Tablet(s) Oral daily  multivitamin 1 Tablet(s) Oral daily  pantoprazole    Tablet 40 milliGRAM(s) Oral before breakfast  senna 2 Tablet(s) Oral at bedtime  traMADol 50 milliGRAM(s) Oral every 6 hours    MEDICATIONS  (PRN):  acetaminophen   Tablet .. 650 milliGRAM(s) Oral every 6 hours PRN Temp greater or equal to 38C (100.4F), Mild Pain (1 - 3)  HYDROmorphone  Injectable 0.5 milliGRAM(s) IV Push every 4 hours PRN severe breakthrough pain  oxyCODONE    IR 10 milliGRAM(s) Oral every 4 hours PRN Severe Pain (7 - 10)  oxyCODONE    IR 5 milliGRAM(s) Oral every 4 hours PRN Moderate Pain (4 - 6)  polyethylene glycol 3350 17 Gram(s) Oral daily PRN Constipation    Vital Signs Last 24 Hrs  T(C): 36.8 (09 Feb 2019 21:36), Max: 36.8 (09 Feb 2019 21:36)  T(F): 98.3 (09 Feb 2019 21:36), Max: 98.3 (09 Feb 2019 21:36)  HR: 82 (09 Feb 2019 21:36) (72 - 96)  BP: 106/65 (09 Feb 2019 21:36) (106/65 - 116/76)  BP(mean): --  RR: 18 (09 Feb 2019 21:36) (18 - 18)  SpO2: 96% (09 Feb 2019 21:36) (95% - 98%)        PHYSICAL EXAM:    GENERAL: NAD, well nourished and conversant  HEAD:  Atraumatic  EYES: EOM, PERRLA, conjunctiva pink and sclera white  ENT: No tonsillar erythema, exudates, or enlargement, moist mucous membranes, good dentition, no lesions  NECK: Supple, No JVD, normal thyroid, carotids with normal upstrokes and no bruits  CHEST/LUNG: Clear to auscultation bilaterally, No rales, rhonchi, wheezing, or rubs  HEART: Regular rate and rhythm, No murmurs, rubs, or gallops  ABDOMEN: Soft, nondistended, no masses, guarding, tenderness or rebound, bowel sounds present  EXTREMITIES:  2+ Peripheral Pulses, No clubbing, cyanosis, or edema.  s/p RIGHT  IT Fx IMN  SKIN: No rashes or lesions  NERVOUS SYSTEM:  Alert & Oriented X3, normal cognitive function. Motor Strength 5/5 right upper and right lower.  5/5 left upper and left lower extremities, DTRs 2+ intact and symmetric              LABS:          CBC Full  -  ( 08 Feb 2019 06:50 )  WBC Count : 9.5 K/uL  Hemoglobin : 9.3 g/dL  Hematocrit : 26.8 %  Platelet Count - Automated : 227 K/uL  Mean Cell Volume : 88.9 fl  Mean Cell Hemoglobin : 30.8 pg  Mean Cell Hemoglobin Concentration : 34.6 gm/dL  Auto Neutrophil # : x  Auto Lymphocyte # : x  Auto Monocyte # : x  Auto Eosinophil # : x  Auto Basophil # : x  Auto Neutrophil % : x  Auto Lymphocyte % : x  Auto Monocyte % : x  Auto Eosinophil % : x  Auto Basophil % : x    02-08    138  |  99  |  11  ----------------------------<  140<H>  4.6   |  29  |  0.44<L>    Ca    8.4      08 Feb 2019 06:50

## 2019-02-10 NOTE — PROGRESS NOTE ADULT - ATTENDING COMMENTS
Beginning to ambulate and doing well. No medical complications post-op to date and to proceed with physical therapy, as tolerated. Continues pulmonary toilet to lessen atelectasis, leg exercises as taught to lessen the risk of DVT and supervised pain medications for post-op pain control. I anticipate transfer to rehabilitation to follow when cleared by orthopedics. Beginning to ambulate and doing well. No medical complications post-op to date and to proceed with physical therapy, as tolerated. Continues pulmonary toilet to lessen atelectasis, leg exercises as taught to lessen the risk of DVT and supervised pain medications for post-op pain control. Out of bed to chair and very fearful when attempting to stand. Physical therapy to resume tomorrow.  I anticipate transfer to rehabilitation to follow when cleared by orthopedics.

## 2019-02-10 NOTE — PROGRESS NOTE ADULT - ASSESSMENT
When brought to the emergency room with inability to ambulate and xray confirmed a right intertrochanteric fracture. History of headaches and pre-op Brain Ct wasnegative. The patient underwent surgical ORIF with a right IM nail on 02/05/19. Patient now seen resting comfortably post op. OOB to chair and beginning to ambulate with physical therapy.  Pains from right hip and her chronic headache are well controlled, at present.

## 2019-02-11 ENCOUNTER — INBOUND DOCUMENT (OUTPATIENT)
Age: 84
End: 2019-02-11

## 2019-02-11 VITALS — OXYGEN SATURATION: 97 % | RESPIRATION RATE: 16 BRPM

## 2019-02-11 PROCEDURE — C1889: CPT

## 2019-02-11 PROCEDURE — 80048 BASIC METABOLIC PNL TOTAL CA: CPT

## 2019-02-11 PROCEDURE — 97530 THERAPEUTIC ACTIVITIES: CPT

## 2019-02-11 PROCEDURE — 99285 EMERGENCY DEPT VISIT HI MDM: CPT | Mod: 25

## 2019-02-11 PROCEDURE — 72170 X-RAY EXAM OF PELVIS: CPT

## 2019-02-11 PROCEDURE — 73502 X-RAY EXAM HIP UNI 2-3 VIEWS: CPT

## 2019-02-11 PROCEDURE — 97165 OT EVAL LOW COMPLEX 30 MIN: CPT

## 2019-02-11 PROCEDURE — 86850 RBC ANTIBODY SCREEN: CPT

## 2019-02-11 PROCEDURE — 97162 PT EVAL MOD COMPLEX 30 MIN: CPT

## 2019-02-11 PROCEDURE — 97116 GAIT TRAINING THERAPY: CPT

## 2019-02-11 PROCEDURE — 76000 FLUOROSCOPY <1 HR PHYS/QHP: CPT

## 2019-02-11 PROCEDURE — C1769: CPT

## 2019-02-11 PROCEDURE — 86901 BLOOD TYPING SEROLOGIC RH(D): CPT

## 2019-02-11 PROCEDURE — 80053 COMPREHEN METABOLIC PANEL: CPT

## 2019-02-11 PROCEDURE — 85610 PROTHROMBIN TIME: CPT

## 2019-02-11 PROCEDURE — 81003 URINALYSIS AUTO W/O SCOPE: CPT

## 2019-02-11 PROCEDURE — 86900 BLOOD TYPING SEROLOGIC ABO: CPT

## 2019-02-11 PROCEDURE — 96374 THER/PROPH/DIAG INJ IV PUSH: CPT

## 2019-02-11 PROCEDURE — 70450 CT HEAD/BRAIN W/O DYE: CPT

## 2019-02-11 PROCEDURE — 85027 COMPLETE CBC AUTOMATED: CPT

## 2019-02-11 PROCEDURE — 71045 X-RAY EXAM CHEST 1 VIEW: CPT

## 2019-02-11 PROCEDURE — 73552 X-RAY EXAM OF FEMUR 2/>: CPT

## 2019-02-11 PROCEDURE — 85730 THROMBOPLASTIN TIME PARTIAL: CPT

## 2019-02-11 PROCEDURE — C1713: CPT

## 2019-02-11 PROCEDURE — 96375 TX/PRO/DX INJ NEW DRUG ADDON: CPT

## 2019-02-11 PROCEDURE — 93005 ELECTROCARDIOGRAM TRACING: CPT

## 2019-02-11 RX ADMIN — TRAMADOL HYDROCHLORIDE 50 MILLIGRAM(S): 50 TABLET ORAL at 07:45

## 2019-02-11 RX ADMIN — TRAMADOL HYDROCHLORIDE 50 MILLIGRAM(S): 50 TABLET ORAL at 01:06

## 2019-02-11 RX ADMIN — Medication 500 MILLIGRAM(S): at 05:54

## 2019-02-11 RX ADMIN — Medication 1 TABLET(S): at 12:28

## 2019-02-11 RX ADMIN — TRAMADOL HYDROCHLORIDE 50 MILLIGRAM(S): 50 TABLET ORAL at 12:30

## 2019-02-11 RX ADMIN — ENOXAPARIN SODIUM 40 MILLIGRAM(S): 100 INJECTION SUBCUTANEOUS at 12:28

## 2019-02-11 RX ADMIN — Medication 1 MILLIGRAM(S): at 12:28

## 2019-02-11 RX ADMIN — PANTOPRAZOLE SODIUM 40 MILLIGRAM(S): 20 TABLET, DELAYED RELEASE ORAL at 05:54

## 2019-02-11 RX ADMIN — TRAMADOL HYDROCHLORIDE 50 MILLIGRAM(S): 50 TABLET ORAL at 00:08

## 2019-02-11 RX ADMIN — TRAMADOL HYDROCHLORIDE 50 MILLIGRAM(S): 50 TABLET ORAL at 05:52

## 2019-02-11 RX ADMIN — TRAMADOL HYDROCHLORIDE 50 MILLIGRAM(S): 50 TABLET ORAL at 13:40

## 2019-02-11 NOTE — PROGRESS NOTE ADULT - SUBJECTIVE AND OBJECTIVE BOX
Ortho Progress Note    S: Patient seen and examined. No acute events overnight. Pt doing well, denies cp/sob    O:  Physical Exam:  Gen: Laying in bed, NAD, alert and oriented.   Resp: Unlabored breathing  Ext: EHL/FHL/TA/Sol intact          + SILT DP/SP/HOROWITZ/Sa          +DP, extremity WWP  Dressing c/d/i      Vital Signs Last 24 Hrs  T(C): 36.7 (11 Feb 2019 05:34), Max: 37.6 (10 Feb 2019 20:52)  T(F): 98.1 (11 Feb 2019 05:34), Max: 99.6 (10 Feb 2019 20:52)  HR: 79 (11 Feb 2019 05:34) (79 - 93)  BP: 106/65 (11 Feb 2019 05:34) (106/65 - 121/74)  BP(mean): --  RR: 18 (11 Feb 2019 05:34) (16 - 18)  SpO2: 98% (11 Feb 2019 05:34) (95% - 98%)                        9.3    9.5   )-----------( 227      ( 08 Feb 2019 06:50 )             26.8     02-08    138  |  99  |  11  ----------------------------<  140<H>  4.6   |  29  |  0.44<L>    Ca    8.4      08 Feb 2019 06:50                           A/P  83y Female w/ R IT Fx s/p R IMN POD 6    -WBAT RLE  -DVT Ppx: Lovenox  - will dc on ASA  -pain control  -Diet: reg  -Dispo: rehab pending

## 2019-02-12 NOTE — PROGRESS NOTE ADULT - ATTENDING COMMENTS
Patient dced to rehab  continue current  med   activity as tolerated  continue current DC  follow up with ortho   Patient aware of plan

## 2019-03-11 ENCOUNTER — APPOINTMENT (OUTPATIENT)
Dept: GERIATRICS | Facility: CLINIC | Age: 84
End: 2019-03-11
Payer: MEDICARE

## 2019-03-11 VITALS
WEIGHT: 119 LBS | HEART RATE: 95 BPM | SYSTOLIC BLOOD PRESSURE: 140 MMHG | OXYGEN SATURATION: 97 % | TEMPERATURE: 97.9 F | DIASTOLIC BLOOD PRESSURE: 72 MMHG

## 2019-03-11 DIAGNOSIS — Z13.21 ENCOUNTER FOR SCREENING FOR NUTRITIONAL DISORDER: ICD-10-CM

## 2019-03-11 DIAGNOSIS — E78.5 HYPERLIPIDEMIA, UNSPECIFIED: ICD-10-CM

## 2019-03-11 DIAGNOSIS — M25.551 PAIN IN RIGHT HIP: ICD-10-CM

## 2019-03-11 DIAGNOSIS — Z82.0 FAMILY HISTORY OF EPILEPSY AND OTHER DISEASES OF THE NERVOUS SYSTEM: ICD-10-CM

## 2019-03-11 DIAGNOSIS — Z71.89 OTHER SPECIFIED COUNSELING: ICD-10-CM

## 2019-03-11 DIAGNOSIS — Z87.898 PERSONAL HISTORY OF OTHER SPECIFIED CONDITIONS: ICD-10-CM

## 2019-03-11 DIAGNOSIS — D64.9 ANEMIA, UNSPECIFIED: ICD-10-CM

## 2019-03-11 DIAGNOSIS — R00.2 PALPITATIONS: ICD-10-CM

## 2019-03-11 DIAGNOSIS — Z87.11 PERSONAL HISTORY OF PEPTIC ULCER DISEASE: ICD-10-CM

## 2019-03-11 DIAGNOSIS — M80.00XD AGE-RELATED OSTEOPOROSIS WITH CURRENT PATHOLOGICAL FRACTURE, UNSPECIFIED SITE, SUBSEQUENT ENCOUNTER FOR FRACTURE WITH ROUTINE HEALING: ICD-10-CM

## 2019-03-11 DIAGNOSIS — Z13.29 ENCOUNTER FOR SCREENING FOR OTHER SUSPECTED ENDOCRINE DISORDER: ICD-10-CM

## 2019-03-11 DIAGNOSIS — Z13.1 ENCOUNTER FOR SCREENING FOR DIABETES MELLITUS: ICD-10-CM

## 2019-03-11 DIAGNOSIS — Z23 ENCOUNTER FOR IMMUNIZATION: ICD-10-CM

## 2019-03-11 DIAGNOSIS — R26.81 UNSTEADINESS ON FEET: ICD-10-CM

## 2019-03-11 DIAGNOSIS — Z82.3 FAMILY HISTORY OF STROKE: ICD-10-CM

## 2019-03-11 DIAGNOSIS — H91.90 UNSPECIFIED HEARING LOSS, UNSPECIFIED EAR: ICD-10-CM

## 2019-03-11 DIAGNOSIS — F32.9 MAJOR DEPRESSIVE DISORDER, SINGLE EPISODE, UNSPECIFIED: ICD-10-CM

## 2019-03-11 DIAGNOSIS — Z63.4 DISAPPEARANCE AND DEATH OF FAMILY MEMBER: ICD-10-CM

## 2019-03-11 PROCEDURE — 99205 OFFICE O/P NEW HI 60 MIN: CPT

## 2019-03-11 RX ORDER — CALCIUM CARBONATE/VITAMIN D3 600 MG-10
600-400 TABLET ORAL
Qty: 90 | Refills: 3 | Status: ACTIVE | COMMUNITY
Start: 2019-03-11 | End: 1900-01-01

## 2019-03-11 RX ORDER — MULTIVITAMIN
TABLET ORAL DAILY
Qty: 30 | Refills: 0 | Status: ACTIVE | COMMUNITY
Start: 2019-03-11

## 2019-03-11 SDOH — SOCIAL STABILITY - SOCIAL INSECURITY: DISSAPEARANCE AND DEATH OF FAMILY MEMBER: Z63.4

## 2019-03-11 NOTE — ASSESSMENT
[FreeTextEntry1] : f/u in 1 month unless earlier PRN\par \par Falls assessment \par MMSE\par Depression eval\par labs \par SW

## 2019-03-11 NOTE — REVIEW OF SYSTEMS
[Loss Of Hearing] : hearing loss [Constipation] : constipation [As Noted in HPI] : as noted in HPI [Sleep Disturbances] : sleep disturbances [Depression] : depression [Easy Bleeding] : a tendency for easy bleeding [Easy Bruising] : a tendency for easy bruising [Negative] : Endocrine

## 2019-03-11 NOTE — HISTORY OF PRESENT ILLNESS
[FreeTextEntry1] : 84 y/o Woman who presents for initial geriatric evaluation s/p recent fall w/ hip fx. \par Arrives 30 mins late for appointment. \par Accompanied by dtr Loida [phone] who assists with history. \par \par Pt reports R hip pain, approx 1-2/10 intensity. Hospitalized s/p fall in  with R hip Fx s/p fall.  Previously was taking oxy for pain but now not taking anything for pain. Ambulates with walker all the time.  Plans to complete home PT at end of this week. Only medicine takes is multivitamin with Ca/Vit D / Vit K and Magnesium. \par \par CHRONIC HEADACHES\par - intermittent, states doesn't happen often, last time 2 days ago, doesn't take anything for headaches. \par \par Palpitations\par - Seeing cardiologist, has cardiac monitor\par - Occassional palpitations for past 1/2 year, otherwise denies CP, SOB, other complaints. \par \par Depressed Mood\par - states started after immigrated from [place], never treated, worse after  , refused antidepressantes in the past per dtr.  \par \par HCM\par - No PMD\par - Last optho eval \par - Had hearing eval approx 1 year - was recommended hearing aids but didn't like them - too noise. \par - Brother  of prostate ca at 61 y/o\par - Declines all vaccines - "doesn't believe in them" \par \par Lives with dtr Loida, 2 grandchildren, and CALLIE after   in 2018.  Loida helps with getting in and out of bed, dressing.  Manages to shower alone.  Loida has 2 children, 2 and 8 y/o. Doesn't cook much. Used to help with house chores but not since hip Sx. \par \par ACP\par - Loida is HCP - agrees to send us previously completed \par - Has living will\par - MOLST discussed \par

## 2019-03-11 NOTE — CURRENT MEDS
[Reports Changes In Medication (including OTC)] : Patient reports changes in medication [Provider aware of all medications taken (including OTC)] : Patient stated provider is aware of all medications ~he/she~ is taking including OTC  [Medication Reconciliation Completed] : Medication reconciliation completed [High Risk Medications Reviewed] : High risk medications reviewed [Patient/caregiver able to verbalize understanding of medications, including indications and side effects] : Patient/caregiver able to verbalize understanding of medications, including indications and side effects [] : does not miss any medication doses

## 2019-03-11 NOTE — PHYSICAL EXAM
[General Appearance - Alert] : alert [General Appearance - In No Acute Distress] : in no acute distress [Sclera] : the sclera and conjunctiva were normal [PERRL With Normal Accommodation] : pupils were equal in size, round, and reactive to light [Extraocular Movements] : extraocular movements were intact [Normal Oral Mucosa] : normal oral mucosa [Outer Ear] : the ears and nose were normal in appearance [Both Tympanic Membranes Were Examined] : both tympanic membranes were normal [Neck Appearance] : the appearance of the neck was normal [Neck Cervical Mass (___cm)] : no neck mass was observed [Jugular Venous Distention Increased] : there was no jugular-venous distention [] : no respiratory distress [Respiration, Rhythm And Depth] : normal respiratory rhythm and effort [Exaggerated Use Of Accessory Muscles For Inspiration] : no accessory muscle use [Auscultation Breath Sounds / Voice Sounds] : lungs were clear to auscultation bilaterally [Heart Sounds] : normal S1 and S2 [Murmurs] : no murmurs [Full Pulse] : the pedal pulses are present [Edema] : there was no peripheral edema [Bowel Sounds] : normal bowel sounds [Abdomen Soft] : soft [Abdomen Tenderness] : non-tender [Cervical Lymph Nodes Enlarged Posterior Bilaterally] : posterior cervical [Cervical Lymph Nodes Enlarged Anterior Bilaterally] : anterior cervical [Supraclavicular Lymph Nodes Enlarged Bilaterally] : supraclavicular [No CVA Tenderness] : no ~M costovertebral angle tenderness [No Spinal Tenderness] : no spinal tenderness [Nail Clubbing] : no clubbing  or cyanosis of the fingernails [Involuntary Movements] : no involuntary movements were seen [Motor Tone] : muscle strength and tone were normal [Skin Color & Pigmentation] : normal skin color and pigmentation [Skin Turgor] : normal skin turgor [No Focal Deficits] : no focal deficits [FreeTextEntry1] : depressed mood

## 2019-03-11 NOTE — SOCIAL HISTORY
[Walker] : walker [Smoke Detector] : smoke detector [Carbon Monoxide Detector] : carbon monoxide detector [Seat Belt] :  uses seat belt [FreeTextEntry1] : misa Mariscal  [FreeTextEntry2] : Assists w/ ADls  [de-identified] : Dtr Loida helps with dressing, ambulates with walker  [de-identified] : Family helps with shopping, meals, housechores/laundry, dtr drives

## 2019-03-11 NOTE — REASON FOR VISIT
[Initial Evaluation] : an initial evaluation [Family Member] : family member [FreeTextEntry1] : recent hip fx

## 2019-03-13 ENCOUNTER — APPOINTMENT (OUTPATIENT)
Dept: ORTHOPEDIC SURGERY | Facility: CLINIC | Age: 84
End: 2019-03-13
Payer: MEDICARE

## 2019-03-13 PROCEDURE — 73502 X-RAY EXAM HIP UNI 2-3 VIEWS: CPT | Mod: RT

## 2019-03-13 PROCEDURE — 99024 POSTOP FOLLOW-UP VISIT: CPT

## 2019-03-13 NOTE — HISTORY OF PRESENT ILLNESS
[None] : None [de-identified] : Status post intramedullary fixation of a right hip fracture 2/5/19 [de-identified] : Status post intramedullary fixation of a right hip fracture 2/5/19. Patient has recently been discharged from a rehabilitation facility and is overall doing well. She is able to walk with a walker [de-identified] : Physical exam of the Right Hip. patient walks with a slight Trendelenburg gait. There is a fully healed surgical incision with resolution of prior edema and ecchymosis. There is no skin breakdown and no signs of infection. The hip ROM is : flexion to 100 degrees, abduction 40 degrees, adduction 10 degrees, external rotation 40 degrees, internal rotation 30 degrees.  There is 4/5 strength on flexion, extension, abduction and adduction compared to the contralateral side.  There is a normal neurovascular exam with 2+ distal pulses \par \par  [de-identified] : 2 views of the right hip taken today and reviewed by me show maintenance of alignment and fixation of this intertrochanteric fracture.The fracture line is almost disappeared [de-identified] : She is overall doing well. She'll continue weightbearing as tolerated. She is given a prescription for therapy. We'll see her back in 8 weeks

## 2019-03-29 ENCOUNTER — OUTPATIENT (OUTPATIENT)
Dept: OUTPATIENT SERVICES | Facility: HOSPITAL | Age: 84
LOS: 1 days | End: 2019-03-29
Payer: MEDICARE

## 2019-03-29 DIAGNOSIS — Z98.891 HISTORY OF UTERINE SCAR FROM PREVIOUS SURGERY: Chronic | ICD-10-CM

## 2019-03-29 DIAGNOSIS — Z90.49 ACQUIRED ABSENCE OF OTHER SPECIFIED PARTS OF DIGESTIVE TRACT: Chronic | ICD-10-CM

## 2019-03-29 DIAGNOSIS — S72.141D DISPLACED INTERTROCHANTERIC FRACTURE OF RIGHT FEMUR, SUBSEQUENT ENCOUNTER FOR CLOSED FRACTURE WITH ROUTINE HEALING: ICD-10-CM

## 2019-05-08 ENCOUNTER — APPOINTMENT (OUTPATIENT)
Dept: ORTHOPEDIC SURGERY | Facility: CLINIC | Age: 84
End: 2019-05-08
Payer: MEDICARE

## 2019-05-08 DIAGNOSIS — S72.141D DISPLACED INTERTROCHANTERIC FRACTURE OF RIGHT FEMUR, SUBSEQUENT ENCOUNTER FOR CLOSED FRACTURE WITH ROUTINE HEALING: ICD-10-CM

## 2019-05-08 PROCEDURE — 99211 OFF/OP EST MAY X REQ PHY/QHP: CPT

## 2019-05-08 PROCEDURE — 73502 X-RAY EXAM HIP UNI 2-3 VIEWS: CPT | Mod: RT

## 2019-05-10 PROBLEM — S72.141D CLOSED DISPLACED INTERTROCHANTERIC FRACTURE OF RIGHT FEMUR WITH ROUTINE HEALING, SUBSEQUENT ENCOUNTER: Status: ACTIVE | Noted: 2019-03-13

## 2019-05-10 NOTE — HISTORY OF PRESENT ILLNESS
[None] : None [de-identified] : Status post intramedullary fixation of a right hip fracture 2/5/19 [de-identified] : Physical exam of the Right Hip. patient walks with a slight Trendelenburg gait. There is a fully healed surgical incision with resolution of prior edema and ecchymosis. There is no skin breakdown and no signs of infection. The hip ROM is : flexion to 100 degrees, abduction 40 degrees, adduction 10 degrees, external rotation 40 degrees, internal rotation 30 degrees.  There is 4/5 strength on flexion, extension, abduction and adduction compared to the contralateral side.  There is a normal neurovascular exam with 2+ distal pulses \par \par  [de-identified] : Status post intramedullary fixation of a right hip fracture 2/5/19. Patient doing well ambulant with a cane. She complains of start up pain.  [de-identified] : 2 views of the right hip taken today and reviewed by me show maintenance of alignment and fixation of this intertrochanteric fracture.The fracture is fully healed  [de-identified] : She is overall doing well. She'll continue weightbearing as tolerated. She is given a prescription for therapy. We'll see her back in 3 months  [de-identified] : Status post intramedullary fixation of a right hip fracture 2/5/19 doing well

## 2019-05-16 NOTE — PRE-ANESTHESIA EVALUATION ADULT - NSANTHOBSERVEDRD_ENT_A_CORE
No Xerosis Normal Treatment: I recommended application of Cetaphil or CeraVe numerous times a day going to bed to all dry areas.

## 2019-06-20 PROCEDURE — 97112 NEUROMUSCULAR REEDUCATION: CPT

## 2019-06-20 PROCEDURE — 97110 THERAPEUTIC EXERCISES: CPT

## 2019-06-20 PROCEDURE — 97161 PT EVAL LOW COMPLEX 20 MIN: CPT

## 2019-06-20 PROCEDURE — 97140 MANUAL THERAPY 1/> REGIONS: CPT

## 2019-06-20 PROCEDURE — 97116 GAIT TRAINING THERAPY: CPT

## 2019-08-12 NOTE — H&P ADULT - ASSESSMENT
A/P: 83 y F sp MF w/ R Intertrochanteric Fracture    Plan for OR tomorrow 2/5 for R Hip IMN with Dr. Kuhn  NPO except for medications  IV fluids while NPO  No chemical DVT ppx, SCDs  NWB RLE   Analgesia, ice as needed  FU Medicine, please document clearance in note  Attending aware and agrees with plan A/P: 83 y F sp MF w/ R Intertrochanteric Fracture    Plan for OR today 2/5 for R Hip IMN with Dr. Kuhn  NPO except for medications  IV fluids while NPO  No chemical DVT ppx, SCDs  NWB RLE   Analgesia, ice as needed  FU Medicine, please document clearance in note  Attending aware and agrees with plan Humira Counseling:  I discussed with the patient the risks of adalimumab including but not limited to myelosuppression, immunosuppression, autoimmune hepatitis, demyelinating diseases, lymphoma, and serious infections.  The patient understands that monitoring is required including a PPD at baseline and must alert us or the primary physician if symptoms of infection or other concerning signs are noted.

## 2019-08-14 ENCOUNTER — APPOINTMENT (OUTPATIENT)
Dept: ORTHOPEDIC SURGERY | Facility: CLINIC | Age: 84
End: 2019-08-14

## 2022-02-22 ENCOUNTER — APPOINTMENT (OUTPATIENT)
Dept: ORTHOPEDIC SURGERY | Facility: CLINIC | Age: 87
End: 2022-02-22
Payer: MEDICARE

## 2022-02-22 VITALS — BODY MASS INDEX: 24.98 KG/M2 | HEIGHT: 58 IN | WEIGHT: 119 LBS

## 2022-02-22 DIAGNOSIS — M54.50 LOW BACK PAIN, UNSPECIFIED: ICD-10-CM

## 2022-02-22 PROCEDURE — 72100 X-RAY EXAM L-S SPINE 2/3 VWS: CPT

## 2022-02-22 PROCEDURE — 99204 OFFICE O/P NEW MOD 45 MIN: CPT

## 2022-05-02 ENCOUNTER — APPOINTMENT (OUTPATIENT)
Dept: ORTHOPEDIC SURGERY | Facility: CLINIC | Age: 87
End: 2022-05-02
Payer: MEDICARE

## 2022-05-02 VITALS
SYSTOLIC BLOOD PRESSURE: 180 MMHG | HEART RATE: 92 BPM | BODY MASS INDEX: 24.98 KG/M2 | WEIGHT: 119 LBS | DIASTOLIC BLOOD PRESSURE: 97 MMHG | HEIGHT: 58 IN

## 2022-05-02 DIAGNOSIS — S32.040A WEDGE COMPRESSION FRACTURE OF FOURTH LUMBAR VERTEBRA, INITIAL ENCOUNTER FOR CLOSED FRACTURE: ICD-10-CM

## 2022-05-02 DIAGNOSIS — M81.0 AGE-RELATED OSTEOPOROSIS W/OUT CURRENT PATHOLOGICAL FRACTURE: ICD-10-CM

## 2022-05-02 DIAGNOSIS — S32.009A UNSPECIFIED FRACTURE OF UNSPECIFIED LUMBAR VERTEBRA, INITIAL ENCOUNTER FOR CLOSED FRACTURE: ICD-10-CM

## 2022-05-02 PROCEDURE — 99214 OFFICE O/P EST MOD 30 MIN: CPT

## 2022-05-02 PROCEDURE — 72100 X-RAY EXAM L-S SPINE 2/3 VWS: CPT

## 2022-05-02 NOTE — HISTORY OF PRESENT ILLNESS
[de-identified] : This 86-year-old woman seen in the office today along with her daughter for acute lower back pain.  She had a compression fracture reportedly 20 years ago with a kyphoplasty at that point at L1.  She saw Dr. Fair 2+ months ago at which point she was having back pain and x-rays revealed a compression fracture of L2.  Those symptoms eventually resolved and over the last day or 2 she has a recurrence of acute lower back pain.  She has not had associated leg pain at this point.  There is no history of recent injury.  She is having significant pain throughout the day including pain at night.

## 2022-05-02 NOTE — REASON FOR VISIT
[Initial Visit] : an initial visit for [Back Pain] : back pain [Family Member] : family member [FreeTextEntry2] : Compression fracture of L4

## 2022-05-02 NOTE — DISCUSSION/SUMMARY
[Medication Risks Reviewed] : Medication risks reviewed [de-identified] : I discussed with the patient and her daughter that she has significant underlying osteoporosis that needs to be worked up and treated.  She has had prior compression fracture 20 years ago treated with a kyphoplasty and a compression fracture of L2 several months ago.  She has also had a hip fracture in the past and currently has a new atraumatic superior endplate compression fracture of L4.  At this point she needs to rest and restrict activity.  She will use Tylenol for pain control.  They are asking for stronger opiate medications and discussed with them that at age 86 I am very concerned that it could affect mental status status and lead to a fall with a even more severe injury and possibly 1 require surgical management.  I discussed the normal course of recovery.  I will see her for follow-up in 2 months.

## 2022-05-02 NOTE — PHYSICAL EXAM
[de-identified] : She is comfortable sitting today.  She is wearing an elastic Velcro lumbosacral back support.  It is removed and there is no evidence of any skin issues. [de-identified] : I reviewed the x-rays of February and she had evidence of a kyphoplasty of L1 which by history was 20 years ago and there was a compression fracture of L2.  In light of her recurrent acute complaints AP and lateral x-rays of the lumbar spine are obtained.  It is now evident that the fracture of L2 was mostly of the inferior endplate which tends to lead to an early resolution of symptoms.  However, there is now a new superior endplate compression fracture of L4.

## 2022-07-05 ENCOUNTER — APPOINTMENT (OUTPATIENT)
Dept: ORTHOPEDIC SURGERY | Facility: CLINIC | Age: 87
End: 2022-07-05

## 2022-07-07 ENCOUNTER — APPOINTMENT (OUTPATIENT)
Dept: ORTHOPEDIC SURGERY | Facility: CLINIC | Age: 87
End: 2022-07-07

## 2023-07-05 NOTE — OCCUPATIONAL THERAPY INITIAL EVALUATION ADULT - IMPAIRED TRANSFERS: SIT/STAND, REHAB EVAL
Pt cancelled apt through [facility], called to inform rescheduling into Sep and to either cb or r/s through [employer].
pain/decreased strength/impaired balance

## 2023-10-19 NOTE — PROGRESS NOTE ADULT - ASSESSMENT
A/P: 83 F s/p R Hip IMN POD 1    Analgesia  DVT ppx  encourage IS  FU Neurology consult, per daughter patient experienced increased dizziness/syncope recently   WBAT  PT/OT  DC planning  will d/w attending and advise if plan changes. Patent

## 2025-04-25 NOTE — ED ADULT NURSE NOTE - NSFALLRSKINDICATORS_ED_ALL_ED
Iza Bernal APNP Ghazazadeh Vallenari,MILAGRO Obgyn Phone Nurse [facility]16 hours ago (5:10 PM)    AG  Please send new rx for 0.5 g. Thanks Iza Chavarria, SHANNA-BC, JUDY, CLC    Rx updated and sent to pt pharmacy.    yes